# Patient Record
Sex: MALE
[De-identification: names, ages, dates, MRNs, and addresses within clinical notes are randomized per-mention and may not be internally consistent; named-entity substitution may affect disease eponyms.]

---

## 2019-01-28 PROBLEM — Z00.00 ENCOUNTER FOR PREVENTIVE HEALTH EXAMINATION: Status: ACTIVE | Noted: 2019-01-28

## 2019-01-29 ENCOUNTER — APPOINTMENT (OUTPATIENT)
Dept: THORACIC SURGERY | Facility: CLINIC | Age: 54
End: 2019-01-29
Payer: MEDICAID

## 2019-01-29 VITALS
RESPIRATION RATE: 17 BRPM | SYSTOLIC BLOOD PRESSURE: 171 MMHG | HEIGHT: 69 IN | HEART RATE: 75 BPM | DIASTOLIC BLOOD PRESSURE: 97 MMHG | BODY MASS INDEX: 26.66 KG/M2 | WEIGHT: 180 LBS | TEMPERATURE: 97.6 F | OXYGEN SATURATION: 99 %

## 2019-01-29 DIAGNOSIS — Z78.9 OTHER SPECIFIED HEALTH STATUS: ICD-10-CM

## 2019-01-29 DIAGNOSIS — Z82.49 FAMILY HISTORY OF ISCHEMIC HEART DISEASE AND OTHER DISEASES OF THE CIRCULATORY SYSTEM: ICD-10-CM

## 2019-01-29 DIAGNOSIS — Z86.39 PERSONAL HISTORY OF OTHER ENDOCRINE, NUTRITIONAL AND METABOLIC DISEASE: ICD-10-CM

## 2019-01-29 DIAGNOSIS — Z86.79 PERSONAL HISTORY OF OTHER DISEASES OF THE CIRCULATORY SYSTEM: ICD-10-CM

## 2019-01-29 DIAGNOSIS — R91.1 SOLITARY PULMONARY NODULE: ICD-10-CM

## 2019-01-29 DIAGNOSIS — Z86.19 PERSONAL HISTORY OF OTHER INFECTIOUS AND PARASITIC DISEASES: ICD-10-CM

## 2019-01-29 DIAGNOSIS — Z82.3 FAMILY HISTORY OF STROKE: ICD-10-CM

## 2019-01-29 DIAGNOSIS — F17.200 NICOTINE DEPENDENCE, UNSPECIFIED, UNCOMPLICATED: ICD-10-CM

## 2019-01-29 PROCEDURE — 99205 OFFICE O/P NEW HI 60 MIN: CPT

## 2019-01-30 PROBLEM — Z86.39 HISTORY OF TYPE 2 DIABETES MELLITUS: Status: RESOLVED | Noted: 2019-01-30 | Resolved: 2019-01-30

## 2019-01-30 PROBLEM — Z86.19 HISTORY OF HEPATITIS C: Status: RESOLVED | Noted: 2019-01-30 | Resolved: 2019-01-30

## 2019-01-30 PROBLEM — Z82.49 FAMILY HISTORY OF CARDIAC DISORDER: Status: ACTIVE | Noted: 2019-01-30

## 2019-01-30 PROBLEM — Z82.3 FAMILY HISTORY OF CEREBROVASCULAR ACCIDENT (CVA): Status: ACTIVE | Noted: 2019-01-30

## 2019-01-30 PROBLEM — Z86.39 HISTORY OF HYPERLIPIDEMIA: Status: RESOLVED | Noted: 2019-01-30 | Resolved: 2019-01-30

## 2019-01-30 PROBLEM — Z78.9 SOCIAL ALCOHOL USE: Status: ACTIVE | Noted: 2019-01-30

## 2019-01-30 PROBLEM — F17.200 CURRENT SOME DAY SMOKER: Status: ACTIVE | Noted: 2019-01-30

## 2019-01-30 PROBLEM — Z86.79 HISTORY OF HYPERTENSION: Status: RESOLVED | Noted: 2019-01-30 | Resolved: 2019-01-30

## 2019-01-30 PROBLEM — Z82.49 FAMILY HISTORY OF MYOCARDIAL INFARCTION: Status: ACTIVE | Noted: 2019-01-30

## 2019-02-01 NOTE — HISTORY OF PRESENT ILLNESS
[FreeTextEntry1] : 54 y/o M, current smoker, w/ hx of HTN, HLD, DM2, acute Hep C (treated in ~2004), and lung nodule found in Nov 2018 s/p FNA.\par Of note, patient takes medications for HTN, HLD and DM2, but he did not bring any of his medications and he does not recall names or dosages. He states that he obtains his medications from [place].\par \par CT Chest on 11/3/18:\par - a 1.1 x 0.9 x 0.9cm RUL ggo (series 3 image 62)\par - few tiny subpleural nodular foci in the WAI measuring up to 2mm\par \par FNA of RUL on 11/7/18 at [facility] Path showed rare atypical cells; benign lung parenchyma w/ mild fibrosis.\par \par PFTs on 1/28/19: FVC 90%, FEV1 94%, DLCO 75%.\par \par Patient is here today for CT Sx consultation, referred by Dr. Nathen Reeder. Denies SOB, CP, cough.

## 2019-02-01 NOTE — PHYSICAL EXAM
[General Appearance - Alert] : alert [General Appearance - In No Acute Distress] : in no acute distress [Sclera] : the sclera and conjunctiva were normal [PERRL With Normal Accommodation] : pupils were equal in size, round, and reactive to light [Extraocular Movements] : extraocular movements were intact [Outer Ear] : the ears and nose were normal in appearance [Oropharynx] : the oropharynx was normal [Neck Appearance] : the appearance of the neck was normal [Neck Cervical Mass (___cm)] : no neck mass was observed [Jugular Venous Distention Increased] : there was no jugular-venous distention [Thyroid Diffuse Enlargement] : the thyroid was not enlarged [Thyroid Nodule] : there were no palpable thyroid nodules [Auscultation Breath Sounds / Voice Sounds] : lungs were clear to auscultation bilaterally [Heart Rate And Rhythm] : heart rate was normal and rhythm regular [Heart Sounds] : normal S1 and S2 [Heart Sounds Gallop] : no gallops [Murmurs] : no murmurs [Heart Sounds Pericardial Friction Rub] : no pericardial rub [Examination Of The Chest] : the chest was normal in appearance [Chest Visual Inspection Thoracic Asymmetry] : no chest asymmetry [Diminished Respiratory Excursion] : normal chest expansion [2+] : left 2+ [Breast Appearance] : normal in appearance [Breast Palpation Mass] : no palpable masses [Bowel Sounds] : normal bowel sounds [Abdomen Soft] : soft [Abdomen Tenderness] : non-tender [Abdomen Mass (___ Cm)] : no abdominal mass palpated [Cervical Lymph Nodes Enlarged Posterior Bilaterally] : posterior cervical [Cervical Lymph Nodes Enlarged Anterior Bilaterally] : anterior cervical [Supraclavicular Lymph Nodes Enlarged Bilaterally] : supraclavicular [No CVA Tenderness] : no ~M costovertebral angle tenderness [No Spinal Tenderness] : no spinal tenderness [Abnormal Walk] : normal gait [Nail Clubbing] : no clubbing  or cyanosis of the fingernails [Musculoskeletal - Swelling] : no joint swelling seen [Motor Tone] : muscle strength and tone were normal [Skin Color & Pigmentation] : normal skin color and pigmentation [Skin Turgor] : normal skin turgor [] : no rash [Deep Tendon Reflexes (DTR)] : deep tendon reflexes were 2+ and symmetric [Sensation] : the sensory exam was normal to light touch and pinprick [No Focal Deficits] : no focal deficits [Oriented To Time, Place, And Person] : oriented to person, place, and time [Impaired Insight] : insight and judgment were intact [Affect] : the affect was normal [FreeTextEntry1] : deferred

## 2019-02-01 NOTE — DATA REVIEWED
[FreeTextEntry1] : CT Chest on 11/3/18:\par - a 1.1 x 0.9 x 0.9cm RUL ggo (series 3 image 62)\par - few tiny subpleural nodular foci in the WAI measuring up to 2mm\par \par FNA of RUL on 11/7/18 at [facility]/. Path showed rare atypical cells; benign lung parenchyma w/ mild fibrosis.\par \par PFTs on 1/28/19: FVC 90%, FEV1 94%, DLCO 75%.

## 2019-02-01 NOTE — ASSESSMENT
[FreeTextEntry1] : 54 y/o M, current smoker, w/ hx of HTN, HLD, DM2, acute Hep C (treated in ~2004), and lung nodule found in Nov 2018 s/p FNA.\par Of note, patient takes medications for HTN, HLD and DM2, but he did not bring any of his medications and he does not recall names or dosages. He states that he obtains his medications from [place].\par \par CT Chest on 11/3/18:\par - a 1.1 x 0.9 x 0.9cm RUL ggo (series 3 image 62)\par - few tiny subpleural nodular foci in the WAI measuring up to 2mm\par \par I have reviewed the patient's medical records and diagnostic images at time of this office consultation and have made the following recommendation:\par 1. CT scan reviewed with patient, I recommended a Rt VATS RUL wedge rxn, possible RULobectomy, MLND on 2/27/19. Risks and benefits and alternatives explained to patient, all questions answered, patient agreed to proceed with surgery.\par 2. Medical clearance and PST\par 3. Obtain slides from [facility]\par \par Written by Florin Suero NP, acting as a scribe for Dr. John Walker.\par \par The documentation recorded by the scribe accurately reflects the service I personally performed and the decisions made by me. JOHN WALKER MD\par

## 2019-02-01 NOTE — CONSULT LETTER
[Dear  ___] : Dear  [unfilled], [Consult Letter:] : I had the pleasure of evaluating your patient, [unfilled]. [( Thank you for referring [unfilled] for consultation for _____ )] : Thank you for referring [unfilled] for consultation for [unfilled] [Please see my note below.] : Please see my note below. [Consult Closing:] : Thank you very much for allowing me to participate in the care of this patient.  If you have any questions, please do not hesitate to contact me. [Sincerely,] : Sincerely, [DrMorris  ___] : Dr. AREVALO [FreeTextEntry2] : Nathen Reeder MD (Pul/Referring)\yonathan Shin MD (PCP) [FreeTextEntry3] : John Sims MD, MPH \par System Director of Thoracic Surgery \par Director of Comprehensive Lung [facility] \par Professor Cardiovascular & Thoracic Surgery  \par [employer] [facility] [employer] at [facility]\par

## 2019-02-15 ENCOUNTER — RESULT REVIEW (OUTPATIENT)
Age: 54
End: 2019-02-15

## 2019-02-19 ENCOUNTER — OUTPATIENT (OUTPATIENT)
Dept: OUTPATIENT SERVICES | Facility: HOSPITAL | Age: 54
LOS: 1 days | End: 2019-02-19
Payer: MEDICAID

## 2019-02-19 VITALS
HEART RATE: 72 BPM | RESPIRATION RATE: 16 BRPM | DIASTOLIC BLOOD PRESSURE: 80 MMHG | HEIGHT: 68.5 IN | WEIGHT: 186.95 LBS | OXYGEN SATURATION: 98 % | TEMPERATURE: 98 F | SYSTOLIC BLOOD PRESSURE: 130 MMHG

## 2019-02-19 DIAGNOSIS — R91.1 SOLITARY PULMONARY NODULE: ICD-10-CM

## 2019-02-19 DIAGNOSIS — E11.9 TYPE 2 DIABETES MELLITUS WITHOUT COMPLICATIONS: ICD-10-CM

## 2019-02-19 LAB
ANION GAP SERPL CALC-SCNC: 13 MMO/L — SIGNIFICANT CHANGE UP (ref 7–14)
BLD GP AB SCN SERPL QL: NEGATIVE — SIGNIFICANT CHANGE UP
BUN SERPL-MCNC: 17 MG/DL — SIGNIFICANT CHANGE UP (ref 7–23)
CALCIUM SERPL-MCNC: 9.8 MG/DL — SIGNIFICANT CHANGE UP (ref 8.4–10.5)
CHLORIDE SERPL-SCNC: 97 MMOL/L — LOW (ref 98–107)
CO2 SERPL-SCNC: 27 MMOL/L — SIGNIFICANT CHANGE UP (ref 22–31)
CREAT SERPL-MCNC: 0.82 MG/DL — SIGNIFICANT CHANGE UP (ref 0.5–1.3)
GLUCOSE SERPL-MCNC: 223 MG/DL — HIGH (ref 70–99)
HBA1C BLD-MCNC: 5.9 % — HIGH (ref 4–5.6)
HCT VFR BLD CALC: 41.3 % — SIGNIFICANT CHANGE UP (ref 39–50)
HGB BLD-MCNC: 14.3 G/DL — SIGNIFICANT CHANGE UP (ref 13–17)
MCHC RBC-ENTMCNC: 30.6 PG — SIGNIFICANT CHANGE UP (ref 27–34)
MCHC RBC-ENTMCNC: 34.6 % — SIGNIFICANT CHANGE UP (ref 32–36)
MCV RBC AUTO: 88.2 FL — SIGNIFICANT CHANGE UP (ref 80–100)
NRBC # FLD: 0 K/UL — LOW (ref 25–125)
PLATELET # BLD AUTO: 193 K/UL — SIGNIFICANT CHANGE UP (ref 150–400)
PMV BLD: 10.7 FL — SIGNIFICANT CHANGE UP (ref 7–13)
POTASSIUM SERPL-MCNC: 3.8 MMOL/L — SIGNIFICANT CHANGE UP (ref 3.5–5.3)
POTASSIUM SERPL-SCNC: 3.8 MMOL/L — SIGNIFICANT CHANGE UP (ref 3.5–5.3)
RBC # BLD: 4.68 M/UL — SIGNIFICANT CHANGE UP (ref 4.2–5.8)
RBC # FLD: 13.2 % — SIGNIFICANT CHANGE UP (ref 10.3–14.5)
RH IG SCN BLD-IMP: POSITIVE — SIGNIFICANT CHANGE UP
SODIUM SERPL-SCNC: 137 MMOL/L — SIGNIFICANT CHANGE UP (ref 135–145)
WBC # BLD: 7.42 K/UL — SIGNIFICANT CHANGE UP (ref 3.8–10.5)
WBC # FLD AUTO: 7.42 K/UL — SIGNIFICANT CHANGE UP (ref 3.8–10.5)

## 2019-02-19 PROCEDURE — 93010 ELECTROCARDIOGRAM REPORT: CPT

## 2019-02-19 RX ORDER — SODIUM CHLORIDE 9 MG/ML
1000 INJECTION, SOLUTION INTRAVENOUS
Qty: 0 | Refills: 0 | Status: DISCONTINUED | OUTPATIENT
Start: 2019-02-27 | End: 2019-03-03

## 2019-02-19 NOTE — H&P PST ADULT - NEGATIVE MUSCULOSKELETAL SYMPTOMS
no muscle cramps/no arthralgia/no joint swelling/no muscle weakness/no myalgia/no neck pain/no arthritis/no back pain/no stiffness

## 2019-02-19 NOTE — H&P PST ADULT - HISTORY OF PRESENT ILLNESS
53 y.o. male with hx of DM, reports hx of abnormal CXR ~6 months ago, followed by CT scan, diagnosed with solitary pulmonary nodule, pt denies cough, sputum production, hemoptysis, weight loss or fatigue, presents to [facility] for evaluation for Right Video Assisted Thoracoscopy, Robotic Assisted Right Upper Lobe Wedge Resection, Right Upper Lobectomy, Mediastinal Lymph Node Dissection on 02/27/19

## 2019-02-19 NOTE — H&P PST ADULT - NEGATIVE GENERAL GENITOURINARY SYMPTOMS
no incontinence/no renal colic/no hematuria/no flank pain L/no bladder infections/no dysuria/no flank pain R

## 2019-02-19 NOTE — H&P PST ADULT - PROBLEM SELECTOR PLAN 1
Pt scheduled for Right Video Assisted Thoracoscopy, Robotic Assisted Right Upper Lobe Wedge Resection, Right Upper Lobectomy, Mediastinal Lymph Node Dissection on 02/27/19  Preop instructions provided. Pt verbalized understanding.   Pepcid for GI prophylaxis provided   Chlorhexidine wash with instructions provided.   pt has appt with PCP for med eval as per surgeon request, copy requested Pt scheduled for Right Video Assisted Thoracoscopy, Robotic Assisted Right Upper Lobe Wedge Resection, Right Upper Lobectomy, Mediastinal Lymph Node Dissection on 02/27/19  Preop instructions provided. Pt verbalized understanding.   Pepcid for GI prophylaxis provided   Chlorhexidine wash with instructions provided.   med eval with cardiology eval pending, copy requested, abnormal EKG @PST, no comparison EKG available Pt scheduled for Right Video Assisted Thoracoscopy, Robotic Assisted Right Upper Lobe Wedge Resection, Right Upper Lobectomy, Mediastinal Lymph Node Dissection on 02/27/19  Preop instructions provided. Pt verbalized understanding.   Pepcid for GI prophylaxis provided   Chlorhexidine wash with instructions provided.   abnormal EKG @ PST, no comparison EKG available, med eval with cardiology eval pending, copy requested, pt denies SOB, CP, palpitations, VSS Pt scheduled for Right Video Assisted Thoracoscopy, Robotic Assisted Right Upper Lobe Wedge Resection, Right Upper Lobectomy, Mediastinal Lymph Node Dissection on 02/27/19  Preop instructions provided. Pt verbalized understanding.   Pepcid for GI prophylaxis provided   Chlorhexidine wash with instructions provided.   abnormal EKG @ PST, comparison EKG in chart, med eval with cardiology eval pending, copy requested, pt denies SOB, CP, palpitations, VSS

## 2019-02-19 NOTE — H&P PST ADULT - MUSCULOSKELETAL
detailed exam no joint warmth/no calf tenderness/no joint swelling/no joint erythema/normal strength/ROM intact details…

## 2019-02-19 NOTE — H&P PST ADULT - NEGATIVE ENMT SYMPTOMS
no sinus symptoms/no post-nasal discharge/no ear pain/no nasal congestion/no throat pain/no dysphagia/no hearing difficulty

## 2019-02-19 NOTE — H&P PST ADULT - NSANTHOSAYNRD_GEN_A_CORE
No. ADILIA screening performed.  STOP BANG Legend: 0-2 = LOW Risk; 3-4 = INTERMEDIATE Risk; 5-8 = HIGH Risk

## 2019-02-27 ENCOUNTER — TRANSCRIPTION ENCOUNTER (OUTPATIENT)
Age: 54
End: 2019-02-27

## 2019-02-27 ENCOUNTER — APPOINTMENT (OUTPATIENT)
Dept: THORACIC SURGERY | Facility: HOSPITAL | Age: 54
End: 2019-02-27

## 2019-02-27 ENCOUNTER — INPATIENT (INPATIENT)
Facility: HOSPITAL | Age: 54
LOS: 3 days | Discharge: HOME | End: 2019-03-03
Attending: THORACIC SURGERY (CARDIOTHORACIC VASCULAR SURGERY) | Admitting: THORACIC SURGERY (CARDIOTHORACIC VASCULAR SURGERY)
Payer: MEDICAID

## 2019-02-27 VITALS
SYSTOLIC BLOOD PRESSURE: 134 MMHG | HEART RATE: 71 BPM | WEIGHT: 186.95 LBS | TEMPERATURE: 98 F | OXYGEN SATURATION: 98 % | DIASTOLIC BLOOD PRESSURE: 80 MMHG | HEIGHT: 68 IN | RESPIRATION RATE: 16 BRPM

## 2019-02-27 DIAGNOSIS — R91.1 SOLITARY PULMONARY NODULE: ICD-10-CM

## 2019-02-27 LAB — RH IG SCN BLD-IMP: POSITIVE — SIGNIFICANT CHANGE UP

## 2019-02-27 PROCEDURE — 32674 THORACOSCOPY LYMPH NODE EXC: CPT

## 2019-02-27 PROCEDURE — S2900 ROBOTIC SURGICAL SYSTEM: CPT | Mod: NC

## 2019-02-27 PROCEDURE — 99233 SBSQ HOSP IP/OBS HIGH 50: CPT

## 2019-02-27 PROCEDURE — 32666 THORACOSCOPY W/WEDGE RESECT: CPT

## 2019-02-27 PROCEDURE — 32666 THORACOSCOPY W/WEDGE RESECT: CPT | Mod: AS

## 2019-02-27 PROCEDURE — 32674 THORACOSCOPY LYMPH NODE EXC: CPT | Mod: AS

## 2019-02-27 PROCEDURE — 71045 X-RAY EXAM CHEST 1 VIEW: CPT | Mod: 26

## 2019-02-27 RX ORDER — DOCUSATE SODIUM 100 MG
100 CAPSULE ORAL THREE TIMES A DAY
Qty: 0 | Refills: 0 | Status: DISCONTINUED | OUTPATIENT
Start: 2019-02-27 | End: 2019-02-28

## 2019-02-27 RX ORDER — DEXTROSE 50 % IN WATER 50 %
25 SYRINGE (ML) INTRAVENOUS ONCE
Qty: 0 | Refills: 0 | Status: DISCONTINUED | OUTPATIENT
Start: 2019-02-27 | End: 2019-03-03

## 2019-02-27 RX ORDER — HYDROMORPHONE HYDROCHLORIDE 2 MG/ML
30 INJECTION INTRAMUSCULAR; INTRAVENOUS; SUBCUTANEOUS
Qty: 0 | Refills: 0 | Status: DISCONTINUED | OUTPATIENT
Start: 2019-02-27 | End: 2019-02-28

## 2019-02-27 RX ORDER — HEPARIN SODIUM 5000 [USP'U]/ML
5000 INJECTION INTRAVENOUS; SUBCUTANEOUS ONCE
Qty: 0 | Refills: 0 | Status: COMPLETED | OUTPATIENT
Start: 2019-02-27 | End: 2019-02-27

## 2019-02-27 RX ORDER — HYDROMORPHONE HYDROCHLORIDE 2 MG/ML
0.5 INJECTION INTRAMUSCULAR; INTRAVENOUS; SUBCUTANEOUS
Qty: 0 | Refills: 0 | Status: DISCONTINUED | OUTPATIENT
Start: 2019-02-27 | End: 2019-02-28

## 2019-02-27 RX ORDER — SODIUM CHLORIDE 9 MG/ML
1000 INJECTION, SOLUTION INTRAVENOUS
Qty: 0 | Refills: 0 | Status: DISCONTINUED | OUTPATIENT
Start: 2019-02-27 | End: 2019-03-03

## 2019-02-27 RX ORDER — INSULIN LISPRO 100/ML
VIAL (ML) SUBCUTANEOUS AT BEDTIME
Qty: 0 | Refills: 0 | Status: DISCONTINUED | OUTPATIENT
Start: 2019-02-27 | End: 2019-03-03

## 2019-02-27 RX ORDER — DEXTROSE 50 % IN WATER 50 %
12.5 SYRINGE (ML) INTRAVENOUS ONCE
Qty: 0 | Refills: 0 | Status: DISCONTINUED | OUTPATIENT
Start: 2019-02-27 | End: 2019-03-03

## 2019-02-27 RX ORDER — ONDANSETRON 8 MG/1
4 TABLET, FILM COATED ORAL EVERY 6 HOURS
Qty: 0 | Refills: 0 | Status: DISCONTINUED | OUTPATIENT
Start: 2019-02-27 | End: 2019-02-28

## 2019-02-27 RX ORDER — HYDROMORPHONE HYDROCHLORIDE 2 MG/ML
0.5 INJECTION INTRAMUSCULAR; INTRAVENOUS; SUBCUTANEOUS
Qty: 0 | Refills: 0 | Status: DISCONTINUED | OUTPATIENT
Start: 2019-02-27 | End: 2019-02-27

## 2019-02-27 RX ORDER — DEXTROSE 50 % IN WATER 50 %
15 SYRINGE (ML) INTRAVENOUS ONCE
Qty: 0 | Refills: 0 | Status: DISCONTINUED | OUTPATIENT
Start: 2019-02-27 | End: 2019-03-03

## 2019-02-27 RX ORDER — GLUCAGON INJECTION, SOLUTION 0.5 MG/.1ML
1 INJECTION, SOLUTION SUBCUTANEOUS ONCE
Qty: 0 | Refills: 0 | Status: DISCONTINUED | OUTPATIENT
Start: 2019-02-27 | End: 2019-03-03

## 2019-02-27 RX ORDER — ONDANSETRON 8 MG/1
4 TABLET, FILM COATED ORAL ONCE
Qty: 0 | Refills: 0 | Status: DISCONTINUED | OUTPATIENT
Start: 2019-02-27 | End: 2019-02-27

## 2019-02-27 RX ORDER — NALOXONE HYDROCHLORIDE 4 MG/.1ML
0.1 SPRAY NASAL
Qty: 0 | Refills: 0 | Status: DISCONTINUED | OUTPATIENT
Start: 2019-02-27 | End: 2019-02-28

## 2019-02-27 RX ORDER — SENNA PLUS 8.6 MG/1
2 TABLET ORAL AT BEDTIME
Qty: 0 | Refills: 0 | Status: DISCONTINUED | OUTPATIENT
Start: 2019-02-27 | End: 2019-02-28

## 2019-02-27 RX ORDER — METFORMIN HYDROCHLORIDE 850 MG/1
1 TABLET ORAL
Qty: 0 | Refills: 0 | COMMUNITY

## 2019-02-27 RX ORDER — INSULIN LISPRO 100/ML
VIAL (ML) SUBCUTANEOUS
Qty: 0 | Refills: 0 | Status: DISCONTINUED | OUTPATIENT
Start: 2019-02-27 | End: 2019-03-03

## 2019-02-27 RX ADMIN — HEPARIN SODIUM 5000 UNIT(S): 5000 INJECTION INTRAVENOUS; SUBCUTANEOUS at 12:37

## 2019-02-27 RX ADMIN — Medication 100 MILLIGRAM(S): at 22:02

## 2019-02-27 RX ADMIN — HYDROMORPHONE HYDROCHLORIDE 30 MILLILITER(S): 2 INJECTION INTRAMUSCULAR; INTRAVENOUS; SUBCUTANEOUS at 17:05

## 2019-02-27 RX ADMIN — SENNA PLUS 2 TABLET(S): 8.6 TABLET ORAL at 22:02

## 2019-02-27 RX ADMIN — SODIUM CHLORIDE 30 MILLILITER(S): 9 INJECTION, SOLUTION INTRAVENOUS at 12:37

## 2019-02-27 RX ADMIN — Medication 1: at 17:32

## 2019-02-27 NOTE — PROGRESS NOTE ADULT - SUBJECTIVE AND OBJECTIVE BOX
GREENMERRILL                     [id number]    HPI:  53 y.o. male with hx of DM, reports hx of abnormal CXR ~6 months ago, followed by CT scan, diagnosed with solitary pulmonary nodule, pt denies cough, sputum production, hemoptysis, weight loss or fatigue, presents to [facility] for evaluation for Right Video Assisted Thoracoscopy, Robotic Assisted Right Upper Lobe Wedge Resection, Right Upper Lobectomy, Mediastinal Lymph Node Dissection on 02/27/19 (19 Feb 2019 10:06)      Procedure:       Issues:  Lung nodule   Diabetes mellitus  Post op[ pain      PAST MEDICAL & SURGICAL HISTORY:  Solitary pulmonary nodule  Diabetes mellitus  No significant past surgical history            VITAL SIGNS:  Vital Signs Last 24 Hrs  T(C): 36.4 (27 Feb 2019 15:05), Max: 36.6 (27 Feb 2019 09:50)  T(F): 97.5 (27 Feb 2019 15:05), Max: 97.9 (27 Feb 2019 09:50)  HR: 86 (27 Feb 2019 18:30) (69 - 88)  BP: 118/76 (27 Feb 2019 18:30) (96/68 - 134/80)  BP(mean): 86 (27 Feb 2019 18:30) (68 - 97)  RR: 18 (27 Feb 2019 18:30) (12 - 23)  SpO2: 100% (27 Feb 2019 18:30) (94% - 100%)    I/Os:   I&O's Detail    27 Feb 2019 07:01  -  27 Feb 2019 19:42  --------------------------------------------------------  IN:    lactated ringers.: 90 mL    Oral Fluid: 500 mL  Total IN: 590 mL    OUT:    Chest Tube: 100 mL  Total OUT: 100 mL    Total NET: 490 mL          CAPILLARY BLOOD GLUCOSE      POCT Blood Glucose.: 158 mg/dL (27 Feb 2019 16:47)  POCT Blood Glucose.: 110 mg/dL (27 Feb 2019 10:13)      =======================MEDICATIONS===================  MEDICATIONS  (STANDING):  dextrose 5%. 1000 milliLiter(s) (50 mL/Hr) IV Continuous <Continuous>  dextrose 50% Injectable 12.5 Gram(s) IV Push once  dextrose 50% Injectable 25 Gram(s) IV Push once  dextrose 50% Injectable 25 Gram(s) IV Push once  docusate sodium 100 milliGRAM(s) Oral three times a day  HYDROmorphone PCA (1 mG/mL) 30 milliLiter(s) PCA Continuous PCA Continuous  insulin lispro (HumaLOG) corrective regimen sliding scale   SubCutaneous three times a day before meals  insulin lispro (HumaLOG) corrective regimen sliding scale   SubCutaneous at bedtime  lactated ringers. 1000 milliLiter(s) (30 mL/Hr) IV Continuous <Continuous>  senna 2 Tablet(s) Oral at bedtime    MEDICATIONS  (PRN):  dextrose 40% Gel 15 Gram(s) Oral once PRN Blood Glucose LESS THAN 70 milliGRAM(s)/deciliter  glucagon  Injectable 1 milliGRAM(s) IntraMuscular once PRN Glucose LESS THAN 70 milligrams/deciliter  HYDROmorphone PCA (1 mG/mL) Rescue Clinician Bolus 0.5 milliGRAM(s) IV Push every 15 minutes PRN for Pain Scale GREATER THAN 6  naloxone Injectable 0.1 milliGRAM(s) IV Push every 3 minutes PRN For ANY of the following changes in patient status:  A. RR LESS THAN 10 breaths per minute, B. Oxygen saturation LESS THAN 90%, C. Sedation score of 6  ondansetron Injectable 4 milliGRAM(s) IV Push every 6 hours PRN Nausea      PHYSICAL EXAM============================  General:                         Awake, alert, not in any distress  Neuro:                            Moving all extremities to commands.   Respiratory:	Air entry fair and  bilateral conducted sounds                                           Effort even and unlabored.  CV:		Regular rate and rhythm. Normal S1/S2                                          Distal pulses present.  Abdomen:	                     Soft, non-distended. Bowel sounds present   Skin:		No rash.  Extremities:	Warm, no cyanosis or edema.  Palpable pulses      =============================NEUROLOGY============================  Pain control with PCA / Tylenol IV / Toradol     ==============================RESPIRATORY========================  Pt is on  2  L nasal canula   Comfortable, not in any distress.  Using incentive spirometry   Monitor chest tube output  Chest tube to suction 	  Continue bronchodilators, pulmonary toilet    ============================CARDIOVASCULAR======================  Continue hemodynamic monitoring.  Not on any pressors    =====================RENAL===================  Continue LR 30CC/hr    Monitor I/Os and electrolytes    ====================GASTROINTESTINAL===================  On clears, tolerating  Continue GI prophylaxis with  Protonix  Continue Zofran / Reglan for nausea - PRN	    ========================HEMATOLOGIC/ONCOLOGIC====================  Monitor chest tube output. No signs of active bleeding.   Follow CBC in AM    ============================INFECTIOUS DISEASE========================  Monitor for fever / leukocytosis.  All surgical incision / chest tube  sites look clean      Pt is on GI & DVT prophylaxis  OOB & ambulate       Pertinent clinical, laboratory, radiographic, hemodynamic, echocardiographic, respiratory data, microbiologic data and chart were reviewed and analyzed frequently throughout the course of the day and night  Patient seen, examined and plan discussed with CT Surgery / CTICU team during rounds.    Pt's status discussed with family at bedside, updated status        Shadi Montero DO FACEP

## 2019-02-27 NOTE — BRIEF OPERATIVE NOTE - PROCEDURE
<<-----Click on this checkbox to enter Procedure Wedge resection of lung  02/27/2019    Active  [id number]

## 2019-02-28 ENCOUNTER — RESULT REVIEW (OUTPATIENT)
Age: 54
End: 2019-02-28

## 2019-02-28 LAB
ANION GAP SERPL CALC-SCNC: 11 MMO/L — SIGNIFICANT CHANGE UP (ref 7–14)
ANION GAP SERPL CALC-SCNC: 16 MMO/L — HIGH (ref 7–14)
BASOPHILS # BLD AUTO: 0.02 K/UL — SIGNIFICANT CHANGE UP (ref 0–0.2)
BASOPHILS NFR BLD AUTO: 0.2 % — SIGNIFICANT CHANGE UP (ref 0–2)
BUN SERPL-MCNC: 20 MG/DL — SIGNIFICANT CHANGE UP (ref 7–23)
BUN SERPL-MCNC: 21 MG/DL — SIGNIFICANT CHANGE UP (ref 7–23)
CALCIUM SERPL-MCNC: 9.4 MG/DL — SIGNIFICANT CHANGE UP (ref 8.4–10.5)
CALCIUM SERPL-MCNC: 9.4 MG/DL — SIGNIFICANT CHANGE UP (ref 8.4–10.5)
CHLORIDE SERPL-SCNC: 100 MMOL/L — SIGNIFICANT CHANGE UP (ref 98–107)
CHLORIDE SERPL-SCNC: 100 MMOL/L — SIGNIFICANT CHANGE UP (ref 98–107)
CO2 SERPL-SCNC: 19 MMOL/L — LOW (ref 22–31)
CO2 SERPL-SCNC: 26 MMOL/L — SIGNIFICANT CHANGE UP (ref 22–31)
CREAT SERPL-MCNC: 0.91 MG/DL — SIGNIFICANT CHANGE UP (ref 0.5–1.3)
CREAT SERPL-MCNC: 0.93 MG/DL — SIGNIFICANT CHANGE UP (ref 0.5–1.3)
EOSINOPHIL # BLD AUTO: 0 K/UL — SIGNIFICANT CHANGE UP (ref 0–0.5)
EOSINOPHIL NFR BLD AUTO: 0 % — SIGNIFICANT CHANGE UP (ref 0–6)
GLUCOSE SERPL-MCNC: 124 MG/DL — HIGH (ref 70–99)
GLUCOSE SERPL-MCNC: 174 MG/DL — HIGH (ref 70–99)
HCT VFR BLD CALC: 38 % — LOW (ref 39–50)
HCT VFR BLD CALC: 38.6 % — LOW (ref 39–50)
HGB BLD-MCNC: 13.5 G/DL — SIGNIFICANT CHANGE UP (ref 13–17)
HGB BLD-MCNC: 13.6 G/DL — SIGNIFICANT CHANGE UP (ref 13–17)
IMM GRANULOCYTES NFR BLD AUTO: 0.2 % — SIGNIFICANT CHANGE UP (ref 0–1.5)
LYMPHOCYTES # BLD AUTO: 16.6 % — SIGNIFICANT CHANGE UP (ref 13–44)
LYMPHOCYTES # BLD AUTO: 2.02 K/UL — SIGNIFICANT CHANGE UP (ref 1–3.3)
MAGNESIUM SERPL-MCNC: 2.3 MG/DL — SIGNIFICANT CHANGE UP (ref 1.6–2.6)
MCHC RBC-ENTMCNC: 30.5 PG — SIGNIFICANT CHANGE UP (ref 27–34)
MCHC RBC-ENTMCNC: 30.7 PG — SIGNIFICANT CHANGE UP (ref 27–34)
MCHC RBC-ENTMCNC: 35.2 % — SIGNIFICANT CHANGE UP (ref 32–36)
MCHC RBC-ENTMCNC: 35.5 % — SIGNIFICANT CHANGE UP (ref 32–36)
MCV RBC AUTO: 86 FL — SIGNIFICANT CHANGE UP (ref 80–100)
MCV RBC AUTO: 87.1 FL — SIGNIFICANT CHANGE UP (ref 80–100)
MONOCYTES # BLD AUTO: 0.68 K/UL — SIGNIFICANT CHANGE UP (ref 0–0.9)
MONOCYTES NFR BLD AUTO: 5.6 % — SIGNIFICANT CHANGE UP (ref 2–14)
NEUTROPHILS # BLD AUTO: 9.45 K/UL — HIGH (ref 1.8–7.4)
NEUTROPHILS NFR BLD AUTO: 77.4 % — HIGH (ref 43–77)
NRBC # FLD: 0 K/UL — LOW (ref 25–125)
NRBC # FLD: 0 K/UL — LOW (ref 25–125)
PHOSPHATE SERPL-MCNC: 4.4 MG/DL — SIGNIFICANT CHANGE UP (ref 2.5–4.5)
PLATELET # BLD AUTO: 213 K/UL — SIGNIFICANT CHANGE UP (ref 150–400)
PLATELET # BLD AUTO: 214 K/UL — SIGNIFICANT CHANGE UP (ref 150–400)
PMV BLD: 10.3 FL — SIGNIFICANT CHANGE UP (ref 7–13)
PMV BLD: 10.3 FL — SIGNIFICANT CHANGE UP (ref 7–13)
POTASSIUM SERPL-MCNC: 3.7 MMOL/L — SIGNIFICANT CHANGE UP (ref 3.5–5.3)
POTASSIUM SERPL-MCNC: 4 MMOL/L — SIGNIFICANT CHANGE UP (ref 3.5–5.3)
POTASSIUM SERPL-SCNC: 3.7 MMOL/L — SIGNIFICANT CHANGE UP (ref 3.5–5.3)
POTASSIUM SERPL-SCNC: 4 MMOL/L — SIGNIFICANT CHANGE UP (ref 3.5–5.3)
RBC # BLD: 4.42 M/UL — SIGNIFICANT CHANGE UP (ref 4.2–5.8)
RBC # BLD: 4.43 M/UL — SIGNIFICANT CHANGE UP (ref 4.2–5.8)
RBC # FLD: 13 % — SIGNIFICANT CHANGE UP (ref 10.3–14.5)
RBC # FLD: 13.1 % — SIGNIFICANT CHANGE UP (ref 10.3–14.5)
SODIUM SERPL-SCNC: 135 MMOL/L — SIGNIFICANT CHANGE UP (ref 135–145)
SODIUM SERPL-SCNC: 137 MMOL/L — SIGNIFICANT CHANGE UP (ref 135–145)
WBC # BLD: 12.2 K/UL — HIGH (ref 3.8–10.5)
WBC # BLD: 13.08 K/UL — HIGH (ref 3.8–10.5)
WBC # FLD AUTO: 12.2 K/UL — HIGH (ref 3.8–10.5)
WBC # FLD AUTO: 13.08 K/UL — HIGH (ref 3.8–10.5)

## 2019-02-28 PROCEDURE — 71045 X-RAY EXAM CHEST 1 VIEW: CPT | Mod: 26,76

## 2019-02-28 PROCEDURE — 88313 SPECIAL STAINS GROUP 2: CPT | Mod: 26

## 2019-02-28 PROCEDURE — 88307 TISSUE EXAM BY PATHOLOGIST: CPT | Mod: 26

## 2019-02-28 PROCEDURE — S2900 ROBOTIC SURGICAL SYSTEM: CPT | Mod: NC

## 2019-02-28 PROCEDURE — 88305 TISSUE EXAM BY PATHOLOGIST: CPT | Mod: 26

## 2019-02-28 PROCEDURE — 38381 THORACIC DUCT PROCEDURE: CPT | Mod: 78

## 2019-02-28 PROCEDURE — 99233 SBSQ HOSP IP/OBS HIGH 50: CPT

## 2019-02-28 PROCEDURE — 88331 PATH CONSLTJ SURG 1 BLK 1SPC: CPT | Mod: 26

## 2019-02-28 PROCEDURE — 38381 THORACIC DUCT PROCEDURE: CPT | Mod: 80,78

## 2019-02-28 PROCEDURE — 32601 THORACOSCOPY DIAGNOSTIC: CPT | Mod: 78

## 2019-02-28 RX ORDER — OXYCODONE HYDROCHLORIDE 5 MG/1
10 TABLET ORAL
Qty: 0 | Refills: 0 | Status: DISCONTINUED | OUTPATIENT
Start: 2019-02-28 | End: 2019-03-01

## 2019-02-28 RX ORDER — NALOXONE HYDROCHLORIDE 4 MG/.1ML
0.1 SPRAY NASAL
Qty: 0 | Refills: 0 | Status: DISCONTINUED | OUTPATIENT
Start: 2019-02-28 | End: 2019-03-02

## 2019-02-28 RX ORDER — DEXTROSE MONOHYDRATE, SODIUM CHLORIDE, AND POTASSIUM CHLORIDE 50; .745; 4.5 G/1000ML; G/1000ML; G/1000ML
1000 INJECTION, SOLUTION INTRAVENOUS
Qty: 0 | Refills: 0 | Status: DISCONTINUED | OUTPATIENT
Start: 2019-02-28 | End: 2019-02-28

## 2019-02-28 RX ORDER — HYDROMORPHONE HYDROCHLORIDE 2 MG/ML
30 INJECTION INTRAMUSCULAR; INTRAVENOUS; SUBCUTANEOUS
Qty: 0 | Refills: 0 | Status: DISCONTINUED | OUTPATIENT
Start: 2019-02-28 | End: 2019-03-02

## 2019-02-28 RX ORDER — HEPARIN SODIUM 5000 [USP'U]/ML
5000 INJECTION INTRAVENOUS; SUBCUTANEOUS EVERY 8 HOURS
Qty: 0 | Refills: 0 | Status: DISCONTINUED | OUTPATIENT
Start: 2019-02-28 | End: 2019-03-03

## 2019-02-28 RX ORDER — ONDANSETRON 8 MG/1
4 TABLET, FILM COATED ORAL EVERY 6 HOURS
Qty: 0 | Refills: 0 | Status: DISCONTINUED | OUTPATIENT
Start: 2019-02-28 | End: 2019-03-02

## 2019-02-28 RX ORDER — ACETAMINOPHEN 500 MG
650 TABLET ORAL EVERY 6 HOURS
Qty: 0 | Refills: 0 | Status: DISCONTINUED | OUTPATIENT
Start: 2019-02-28 | End: 2019-03-01

## 2019-02-28 RX ORDER — OXYCODONE HYDROCHLORIDE 5 MG/1
5 TABLET ORAL
Qty: 0 | Refills: 0 | Status: DISCONTINUED | OUTPATIENT
Start: 2019-02-28 | End: 2019-03-01

## 2019-02-28 RX ADMIN — Medication 100 MILLIGRAM(S): at 06:37

## 2019-02-28 RX ADMIN — SODIUM CHLORIDE 30 MILLILITER(S): 9 INJECTION, SOLUTION INTRAVENOUS at 07:18

## 2019-02-28 RX ADMIN — SODIUM CHLORIDE 30 MILLILITER(S): 9 INJECTION, SOLUTION INTRAVENOUS at 20:38

## 2019-02-28 RX ADMIN — HYDROMORPHONE HYDROCHLORIDE 30 MILLILITER(S): 2 INJECTION INTRAMUSCULAR; INTRAVENOUS; SUBCUTANEOUS at 23:05

## 2019-02-28 RX ADMIN — Medication 1: at 11:18

## 2019-02-28 RX ADMIN — HYDROMORPHONE HYDROCHLORIDE 30 MILLILITER(S): 2 INJECTION INTRAMUSCULAR; INTRAVENOUS; SUBCUTANEOUS at 07:20

## 2019-02-28 RX ADMIN — HEPARIN SODIUM 5000 UNIT(S): 5000 INJECTION INTRAVENOUS; SUBCUTANEOUS at 21:09

## 2019-02-28 RX ADMIN — HEPARIN SODIUM 5000 UNIT(S): 5000 INJECTION INTRAVENOUS; SUBCUTANEOUS at 15:52

## 2019-02-28 NOTE — PROGRESS NOTE ADULT - SUBJECTIVE AND OBJECTIVE BOX
Anesthesia Pain Management Service    SUBJECTIVE: Patient is doing well with IV PCA and no significant problems reported. Tolerating PO    Pain Scale Score	At rest: __2_ 	With Activity: ___ 	[X ] Refer to charted pain scores    THERAPY:    [ ] IV PCA Morphine		[ ] 5 mg/mL	[ ] 1 mg/mL  [X ] IV PCA Hydromorphone	[ ] 5 mg/mL	[X ] 1 mg/mL  [ ] IV PCA Fentanyl		[ ] 50 micrograms/mL    Demand dose __0.2_ lockout __6_ (minutes) Continuous Rate _0__ Total: _0.8__   mg used (in past 24 hrs)      MEDICATIONS  (STANDING):  acetaminophen   Tablet .. 650 milliGRAM(s) Oral every 6 hours  dextrose 5% + sodium chloride 0.45% with potassium chloride 20 mEq/L 1000 milliLiter(s) (75 mL/Hr) IV Continuous <Continuous>  dextrose 5%. 1000 milliLiter(s) (50 mL/Hr) IV Continuous <Continuous>  dextrose 50% Injectable 12.5 Gram(s) IV Push once  dextrose 50% Injectable 25 Gram(s) IV Push once  dextrose 50% Injectable 25 Gram(s) IV Push once  heparin  Injectable 5000 Unit(s) SubCutaneous every 8 hours  insulin lispro (HumaLOG) corrective regimen sliding scale   SubCutaneous three times a day before meals  insulin lispro (HumaLOG) corrective regimen sliding scale   SubCutaneous at bedtime  lactated ringers. 1000 milliLiter(s) (30 mL/Hr) IV Continuous <Continuous>    MEDICATIONS  (PRN):  dextrose 40% Gel 15 Gram(s) Oral once PRN Blood Glucose LESS THAN 70 milliGRAM(s)/deciliter  glucagon  Injectable 1 milliGRAM(s) IntraMuscular once PRN Glucose LESS THAN 70 milligrams/deciliter  oxyCODONE    IR 5 milliGRAM(s) Oral every 3 hours PRN mild to moderate  oxyCODONE    IR 10 milliGRAM(s) Oral every 3 hours PRN Severe Pain (7 - 10)      OBJECTIVE:    Sedation Score:	[ X] Alert	[ ] Drowsy 	[ ] Arousable	[ ] Asleep	[ ] Unresponsive    Side Effects:	[X ] None	[ ] Nausea	[ ] Vomiting	[ ] Pruritus  		[ ] Other:    Vital Signs Last 24 Hrs  T(C): 36.4 (28 Feb 2019 08:00), Max: 36.7 (28 Feb 2019 00:00)  T(F): 97.6 (28 Feb 2019 08:00), Max: 98 (28 Feb 2019 00:00)  HR: 67 (28 Feb 2019 11:00) (67 - 89)  BP: 104/72 (28 Feb 2019 11:00) (96/68 - 137/85)  BP(mean): 80 (28 Feb 2019 11:00) (68 - 104)  RR: 13 (28 Feb 2019 11:00) (10 - 23)  SpO2: 99% (28 Feb 2019 11:00) (94% - 100%)    ASSESSMENT/ PLAN    Therapy to  be:	[ ] Continue   [ X] Discontinued   [X ] Change to prn Analgesics    Documentation and Verification of current medications:   [X] Done	[ ] Not done, not elligible    Comments: PRN Oral/IV opioids and/or Adjuvant non-opioid medication to be ordered at this point.    Progress Note written now but Patient was seen earlier.

## 2019-02-28 NOTE — PROGRESS NOTE ADULT - SUBJECTIVE AND OBJECTIVE BOX
PETERMERRILL                     [id number]    HPI:  53 y.o. male with hx of DM, reports hx of abnormal CXR ~6 months ago, followed by CT scan, diagnosed with solitary pulmonary nodule, pt denies cough, sputum production, hemoptysis, weight loss or fatigue, presents to [facility] for evaluation for Right Video Assisted Thoracoscopy, Robotic Assisted Right Upper Lobe Wedge Resection, Right Upper Lobectomy, Mediastinal Lymph Node Dissection on 02/27/19 (19 Feb 2019 10:06)      Procedure:   Wedge resection of lung , RUL 02/27/2019     Issues:  Lung nodule   Diabetes mellitus  Post op[ pain      PAST MEDICAL & SURGICAL HISTORY:  Solitary pulmonary nodule  Diabetes mellitus  No significant past surgical history            VITAL SIGNS:  Vital Signs Last 24 Hrs  T(C): 36.7 (28 Feb 2019 00:00), Max: 36.7 (28 Feb 2019 00:00)  T(F): 98 (28 Feb 2019 00:00), Max: 98 (28 Feb 2019 00:00)  HR: 80 (28 Feb 2019 06:00) (69 - 89)  BP: 132/93 (28 Feb 2019 06:00) (96/68 - 137/85)  BP(mean): 102 (28 Feb 2019 06:00) (68 - 104)  RR: 22 (28 Feb 2019 06:00) (10 - 23)  SpO2: 100% (28 Feb 2019 06:00) (94% - 100%)    I/Os:   I&O's Detail    27 Feb 2019 07:01  -  28 Feb 2019 07:00  --------------------------------------------------------  IN:    lactated ringers.: 480 mL    Oral Fluid: 740 mL  Total IN: 1220 mL    OUT:    Chest Tube: 320 mL    Voided: 700 mL  Total OUT: 1020 mL    Total NET: 200 mL          CAPILLARY BLOOD GLUCOSE      POCT Blood Glucose.: 214 mg/dL (27 Feb 2019 22:04)  POCT Blood Glucose.: 158 mg/dL (27 Feb 2019 16:47)  POCT Blood Glucose.: 110 mg/dL (27 Feb 2019 10:13)      =======================MEDICATIONS===================  MEDICATIONS  (STANDING):  dextrose 5%. 1000 milliLiter(s) (50 mL/Hr) IV Continuous <Continuous>  dextrose 50% Injectable 12.5 Gram(s) IV Push once  dextrose 50% Injectable 25 Gram(s) IV Push once  dextrose 50% Injectable 25 Gram(s) IV Push once  docusate sodium 100 milliGRAM(s) Oral three times a day  heparin  Injectable 5000 Unit(s) SubCutaneous every 8 hours  HYDROmorphone PCA (1 mG/mL) 30 milliLiter(s) PCA Continuous PCA Continuous  insulin lispro (HumaLOG) corrective regimen sliding scale   SubCutaneous three times a day before meals  insulin lispro (HumaLOG) corrective regimen sliding scale   SubCutaneous at bedtime  lactated ringers. 1000 milliLiter(s) (30 mL/Hr) IV Continuous <Continuous>  senna 2 Tablet(s) Oral at bedtime    MEDICATIONS  (PRN):  dextrose 40% Gel 15 Gram(s) Oral once PRN Blood Glucose LESS THAN 70 milliGRAM(s)/deciliter  glucagon  Injectable 1 milliGRAM(s) IntraMuscular once PRN Glucose LESS THAN 70 milligrams/deciliter  HYDROmorphone PCA (1 mG/mL) Rescue Clinician Bolus 0.5 milliGRAM(s) IV Push every 15 minutes PRN for Pain Scale GREATER THAN 6  naloxone Injectable 0.1 milliGRAM(s) IV Push every 3 minutes PRN For ANY of the following changes in patient status:  A. RR LESS THAN 10 breaths per minute, B. Oxygen saturation LESS THAN 90%, C. Sedation score of 6  ondansetron Injectable 4 milliGRAM(s) IV Push every 6 hours PRN Nausea      =======================VENTILATOR SETTINGS===================      =======================PATIENT CARE ACCESS DEVICES===================  Peripheral IV  Arterial Line	R	L	PT	DP	Fem	Rad	Ax	Placed:		  Urinary Catheter, Date Placed:   Necessity of urinary, arterial, and venous catheters discussed    PHYSICAL EXAM============================  General:                         Awake, alert, not in any distress  Neuro:                            Moving all extremities to commands.   Respiratory:	Air entry fair and  bilateral conducted sounds                                           Effort even and unlabored.  CV:		Regular rate and rhythm. Normal S1/S2                                          Distal pulses present.  Abdomen:	                     Soft, non-distended. Bowel sounds present   Skin:		No rash.  Extremities:	Warm, no cyanosis or edema.  Palpable pulses    ============================LABS=========================                        13.5   12.20 )-----------( 214      ( 28 Feb 2019 04:00 )             38.0     02-28    135  |  100  |  20  ----------------------------<  174<H>  4.0   |  19<L>  |  0.91    Ca    9.4      28 Feb 2019 04:00    A/P     ===========================NEUROLOGY============================  Pain control with PCA / Tylenol IV / Toradol     ==============================RESPIRATORY========================  Pt is on  2  L nasal canula   Comfortable, not in any distress.  Using incentive spirometry   Monitor chest tube output  Chest tube to suction 	  Continue bronchodilators, pulmonary toilet    ============================CARDIOVASCULAR======================  Continue hemodynamic monitoring.  Not on any pressors    =====================RENAL===================  Continue LR 30CC/hr    Monitor I/Os and electrolytes    ====================GASTROINTESTINAL===================  On Reg,  tolerating  Continue GI prophylaxis with  Protonix  Continue Zofran / Reglan for nausea - PRN	    ========================HEMATOLOGIC/ONCOLOGIC====================  Monitor chest tube output. No signs of active bleeding.   Follow CBC in AM    ============================INFECTIOUS DISEASE========================  Monitor for fever / leukocytosis.  All surgical incision / chest tube  sites look clean      Pt is on GI & DVT prophylaxis  OOB & ambulate       Pertinent clinical, laboratory, radiographic, hemodynamic, echocardiographic, respiratory data, microbiologic data and chart were reviewed and analyzed frequently throughout the course of the day and night  Patient seen, examined and plan discussed with CT Surgery / CTICU team during rounds.    Pt's status discussed with family at bedside, updated status            Shadi Montero DO FACEP

## 2019-02-28 NOTE — BRIEF OPERATIVE NOTE - OPERATION/FINDINGS
FB. RUL wedge, MLND
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

## 2019-02-28 NOTE — PROGRESS NOTE ADULT - SUBJECTIVE AND OBJECTIVE BOX
ANESTHESIA POSTOP CHECK    53y Male POSTOP DAY 1 S/P R VATS    Vital Signs Last 24 Hrs  T(C): 36.4 (28 Feb 2019 08:00), Max: 36.7 (28 Feb 2019 00:00)  T(F): 97.6 (28 Feb 2019 08:00), Max: 98 (28 Feb 2019 00:00)  HR: 67 (28 Feb 2019 11:00) (67 - 89)  BP: 104/72 (28 Feb 2019 11:00) (96/68 - 137/85)  BP(mean): 80 (28 Feb 2019 11:00) (68 - 104)  RR: 13 (28 Feb 2019 11:00) (10 - 23)  SpO2: 99% (28 Feb 2019 11:00) (94% - 100%)  I&O's Summary    27 Feb 2019 07:01  -  28 Feb 2019 07:00  --------------------------------------------------------  IN: 1460 mL / OUT: 1350 mL / NET: 110 mL    28 Feb 2019 07:01  -  28 Feb 2019 12:25  --------------------------------------------------------  IN: 405 mL / OUT: 100 mL / NET: 305 mL        [X ] NO APPARENT ANESTHESIA COMPLICATIONS      Comments: none

## 2019-02-28 NOTE — BRIEF OPERATIVE NOTE - PROCEDURE
<<-----Click on this checkbox to enter Procedure Ligation of thoracic duct  02/28/2019  Robotic right VATS, ligation of thoracic duct, control of chyle leak  Active  LATONIA

## 2019-03-01 LAB
ANION GAP SERPL CALC-SCNC: 14 MMO/L — SIGNIFICANT CHANGE UP (ref 7–14)
BUN SERPL-MCNC: 15 MG/DL — SIGNIFICANT CHANGE UP (ref 7–23)
CALCIUM SERPL-MCNC: 8.9 MG/DL — SIGNIFICANT CHANGE UP (ref 8.4–10.5)
CHLORIDE SERPL-SCNC: 101 MMOL/L — SIGNIFICANT CHANGE UP (ref 98–107)
CO2 SERPL-SCNC: 22 MMOL/L — SIGNIFICANT CHANGE UP (ref 22–31)
CREAT SERPL-MCNC: 0.92 MG/DL — SIGNIFICANT CHANGE UP (ref 0.5–1.3)
GLUCOSE SERPL-MCNC: 141 MG/DL — HIGH (ref 70–99)
HCT VFR BLD CALC: 40.8 % — SIGNIFICANT CHANGE UP (ref 39–50)
HGB BLD-MCNC: 14 G/DL — SIGNIFICANT CHANGE UP (ref 13–17)
MCHC RBC-ENTMCNC: 30.3 PG — SIGNIFICANT CHANGE UP (ref 27–34)
MCHC RBC-ENTMCNC: 34.3 % — SIGNIFICANT CHANGE UP (ref 32–36)
MCV RBC AUTO: 88.3 FL — SIGNIFICANT CHANGE UP (ref 80–100)
NRBC # FLD: 0 K/UL — LOW (ref 25–125)
PLATELET # BLD AUTO: 223 K/UL — SIGNIFICANT CHANGE UP (ref 150–400)
PMV BLD: 10.5 FL — SIGNIFICANT CHANGE UP (ref 7–13)
POTASSIUM SERPL-MCNC: 4.1 MMOL/L — SIGNIFICANT CHANGE UP (ref 3.5–5.3)
POTASSIUM SERPL-SCNC: 4.1 MMOL/L — SIGNIFICANT CHANGE UP (ref 3.5–5.3)
RBC # BLD: 4.62 M/UL — SIGNIFICANT CHANGE UP (ref 4.2–5.8)
RBC # FLD: 13.3 % — SIGNIFICANT CHANGE UP (ref 10.3–14.5)
SODIUM SERPL-SCNC: 137 MMOL/L — SIGNIFICANT CHANGE UP (ref 135–145)
WBC # BLD: 15.53 K/UL — HIGH (ref 3.8–10.5)
WBC # FLD AUTO: 15.53 K/UL — HIGH (ref 3.8–10.5)

## 2019-03-01 PROCEDURE — 71045 X-RAY EXAM CHEST 1 VIEW: CPT | Mod: 26

## 2019-03-01 PROCEDURE — 99233 SBSQ HOSP IP/OBS HIGH 50: CPT

## 2019-03-01 RX ORDER — IPRATROPIUM BROMIDE 0.2 MG/ML
500 SOLUTION, NON-ORAL INHALATION EVERY 6 HOURS
Qty: 0 | Refills: 0 | Status: DISCONTINUED | OUTPATIENT
Start: 2019-03-01 | End: 2019-03-03

## 2019-03-01 RX ORDER — SODIUM CHLORIDE 9 MG/ML
5 INJECTION INTRAMUSCULAR; INTRAVENOUS; SUBCUTANEOUS ONCE
Qty: 0 | Refills: 0 | Status: COMPLETED | OUTPATIENT
Start: 2019-03-01 | End: 2019-03-02

## 2019-03-01 RX ORDER — ALBUTEROL 90 UG/1
2 AEROSOL, METERED ORAL EVERY 4 HOURS
Qty: 0 | Refills: 0 | Status: DISCONTINUED | OUTPATIENT
Start: 2019-03-01 | End: 2019-03-03

## 2019-03-01 RX ORDER — HYDROMORPHONE HYDROCHLORIDE 2 MG/ML
0.5 INJECTION INTRAMUSCULAR; INTRAVENOUS; SUBCUTANEOUS
Qty: 0 | Refills: 0 | Status: DISCONTINUED | OUTPATIENT
Start: 2019-03-01 | End: 2019-03-02

## 2019-03-01 RX ORDER — ACETAMINOPHEN 500 MG
1000 TABLET ORAL ONCE
Qty: 0 | Refills: 0 | Status: DISCONTINUED | OUTPATIENT
Start: 2019-03-01 | End: 2019-03-03

## 2019-03-01 RX ORDER — ACETAMINOPHEN 500 MG
1000 TABLET ORAL ONCE
Qty: 0 | Refills: 0 | Status: COMPLETED | OUTPATIENT
Start: 2019-03-01 | End: 2019-03-01

## 2019-03-01 RX ADMIN — Medication 1000 MILLIGRAM(S): at 20:30

## 2019-03-01 RX ADMIN — SODIUM CHLORIDE 30 MILLILITER(S): 9 INJECTION, SOLUTION INTRAVENOUS at 19:25

## 2019-03-01 RX ADMIN — Medication 400 MILLIGRAM(S): at 20:00

## 2019-03-01 RX ADMIN — HYDROMORPHONE HYDROCHLORIDE 30 MILLILITER(S): 2 INJECTION INTRAMUSCULAR; INTRAVENOUS; SUBCUTANEOUS at 19:26

## 2019-03-01 RX ADMIN — HYDROMORPHONE HYDROCHLORIDE 30 MILLILITER(S): 2 INJECTION INTRAMUSCULAR; INTRAVENOUS; SUBCUTANEOUS at 07:20

## 2019-03-01 RX ADMIN — HEPARIN SODIUM 5000 UNIT(S): 5000 INJECTION INTRAVENOUS; SUBCUTANEOUS at 14:06

## 2019-03-01 RX ADMIN — HEPARIN SODIUM 5000 UNIT(S): 5000 INJECTION INTRAVENOUS; SUBCUTANEOUS at 21:47

## 2019-03-01 RX ADMIN — Medication 1: at 11:48

## 2019-03-01 RX ADMIN — HEPARIN SODIUM 5000 UNIT(S): 5000 INJECTION INTRAVENOUS; SUBCUTANEOUS at 06:16

## 2019-03-01 NOTE — PROGRESS NOTE ADULT - SUBJECTIVE AND OBJECTIVE BOX
ANESTHESIA POSTOP CHECK    53y Male POSTOP DAY 1 S/P revision of R VAT    Vital Signs Last 24 Hrs  T(C): 36.6 (01 Mar 2019 08:00), Max: 36.8 (01 Mar 2019 04:00)  T(F): 97.9 (01 Mar 2019 08:00), Max: 98.2 (01 Mar 2019 04:00)  HR: 72 (01 Mar 2019 08:00) (63 - 81)  BP: 108/67 (01 Mar 2019 08:00) (95/66 - 147/90)  BP(mean): 78 (01 Mar 2019 08:00) (66 - 99)  RR: 14 (01 Mar 2019 08:00) (13 - 25)  SpO2: 99% (01 Mar 2019 08:00) (94% - 100%)  I&O's Summary    28 Feb 2019 07:01  -  01 Mar 2019 07:00  --------------------------------------------------------  IN: 1185 mL / OUT: 935 mL / NET: 250 mL    01 Mar 2019 07:01  -  01 Mar 2019 09:14  --------------------------------------------------------  IN: 30 mL / OUT: 0 mL / NET: 30 mL        [X ] NO APPARENT ANESTHESIA COMPLICATIONS      Comments: none

## 2019-03-01 NOTE — PROGRESS NOTE ADULT - SUBJECTIVE AND OBJECTIVE BOX
Anesthesia Pain Management Service    SUBJECTIVE: Patient is doing well with IV PCA, no nausea reported.  Patient still has pain despite the IV PCA pump.    Pain Scale Score	At rest: _10/10_ 	With Activity: ___ 	[X ] Refer to charted pain scores    THERAPY:    [ ] IV PCA Morphine		[ ] 5 mg/mL	[ ] 1 mg/mL  [X ] IV PCA Hydromorphone	[ ] 5 mg/mL	[X ] 1 mg/mL  [ ] IV PCA Fentanyl		[ ] 50 micrograms/mL    Demand dose __0.2_ lockout __6_ (minutes) Continuous Rate _0__ Total: _1.8__  mg used (in past 24 hours)      MEDICATIONS  (STANDING):  acetaminophen  IVPB .. 1000 milliGRAM(s) IV Intermittent once  dextrose 5%. 1000 milliLiter(s) (50 mL/Hr) IV Continuous <Continuous>  dextrose 50% Injectable 12.5 Gram(s) IV Push once  dextrose 50% Injectable 25 Gram(s) IV Push once  dextrose 50% Injectable 25 Gram(s) IV Push once  heparin  Injectable 5000 Unit(s) SubCutaneous every 8 hours  HYDROmorphone PCA (1 mG/mL) 30 milliLiter(s) PCA Continuous PCA Continuous  insulin lispro (HumaLOG) corrective regimen sliding scale   SubCutaneous three times a day before meals  insulin lispro (HumaLOG) corrective regimen sliding scale   SubCutaneous at bedtime  lactated ringers. 1000 milliLiter(s) (30 mL/Hr) IV Continuous <Continuous>    MEDICATIONS  (PRN):  dextrose 40% Gel 15 Gram(s) Oral once PRN Blood Glucose LESS THAN 70 milliGRAM(s)/deciliter  glucagon  Injectable 1 milliGRAM(s) IntraMuscular once PRN Glucose LESS THAN 70 milligrams/deciliter  HYDROmorphone PCA (1 mG/mL) Rescue Clinician Bolus 0.5 milliGRAM(s) IV Push every 15 minutes PRN for Pain Scale GREATER THAN 6  naloxone Injectable 0.1 milliGRAM(s) IV Push every 3 minutes PRN For ANY of the following changes in patient status:  A. RR LESS THAN 10 breaths per minute, B. Oxygen saturation LESS THAN 90%, C. Sedation score of 6  ondansetron Injectable 4 milliGRAM(s) IV Push every 6 hours PRN Nausea      OBJECTIVE:  Patient is sitting up in chair, drinking clears.    Sedation Score:	[ X] Alert	[ ] Drowsy 	[ ] Arousable	[ ] Asleep	[ ] Unresponsive    Side Effects:	[X ] None	[ ] Nausea	[ ] Vomiting	[ ] Pruritus  		[ ] Other:    Vital Signs Last 24 Hrs  T(C): 36.6 (01 Mar 2019 08:00), Max: 36.8 (01 Mar 2019 04:00)  T(F): 97.9 (01 Mar 2019 08:00), Max: 98.2 (01 Mar 2019 04:00)  HR: 72 (01 Mar 2019 08:00) (63 - 81)  BP: 108/67 (01 Mar 2019 08:00) (95/66 - 147/90)  BP(mean): 78 (01 Mar 2019 08:00) (66 - 99)  RR: 14 (01 Mar 2019 08:00) (13 - 25)  SpO2: 99% (01 Mar 2019 08:00) (94% - 100%)    ASSESSMENT/ PLAN    Therapy to  be:	[ X] Continue   [ ] Discontinued   [ ] Change to prn Analgesics    Documentation and Verification of current medications:   [X] Done	[ ] Not done, not elligible    Comments: Continue current pain regimen and IV Tylenol added.

## 2019-03-01 NOTE — PROGRESS NOTE ADULT - SUBJECTIVE AND OBJECTIVE BOX
MERRILL GREEN            [id number]         No Known Allergies               53 y.o. male with hx of DM, reports hx of abnormal CXR ~6 months ago, followed by CT scan, diagnosed with solitary pulmonary nodule, pt denies cough, sputum production, hemoptysis, weight loss or fatigue, presents to [facility] for evaluation for Right Video Assisted Thoracoscopy, Robotic Assisted Right Upper Lobe Wedge Resection, Right Upper Lobectomy, Mediastinal Lymph Node Dissection on 02/27/19 (19 Feb 2019 10:06)      Procedure: 2/27 FB. RUL wedge, MLNDFB.        2/28 Robotic right VATS, ligation of thoracic duct, control of chyle leak    Issues:  Chylothorax  Lung nodule  DM-2  Postop                   Home Medications:  metFORMIN 500 mg oral tablet: 1 tab(s) orally 3 times a day (27 Feb 2019 10:30)      PAST MEDICAL & SURGICAL HISTORY:  Solitary pulmonary nodule  Diabetes mellitus  No significant past surgical history        ICU Vital Signs Last 24 Hrs  T(C): 36.8 (01 Mar 2019 04:00), Max: 36.8 (01 Mar 2019 04:00)  T(F): 98.2 (01 Mar 2019 04:00), Max: 98.2 (01 Mar 2019 04:00)  HR: 67 (01 Mar 2019 04:00) (67 - 85)  BP: 101/64 (01 Mar 2019 04:00) (95/66 - 147/90)  BP(mean): 73 (01 Mar 2019 04:00) (66 - 102)  ABP: --  ABP(mean): --  RR: 17 (01 Mar 2019 04:00) (13 - 25)  SpO2: 96% (01 Mar 2019 04:00) (94% - 100%)    I&O's Detail    27 Feb 2019 07:01  -  28 Feb 2019 07:00  --------------------------------------------------------  IN:    lactated ringers.: 480 mL    Oral Fluid: 980 mL  Total IN: 1460 mL    OUT:    Chest Tube: 350 mL    Voided: 1000 mL  Total OUT: 1350 mL    Total NET: 110 mL      28 Feb 2019 07:01  -  01 Mar 2019 05:01  --------------------------------------------------------  IN:    dextrose 5% + sodium chloride 0.45% with potassium chloride 20 mEq/L: 525 mL    lactated ringers.: 330 mL    Oral Fluid: 240 mL  Total IN: 1095 mL    OUT:    Chest Tube: 335 mL    Voided: 550 mL  Total OUT: 885 mL    Total NET: 210 mL        CAPILLARY BLOOD GLUCOSE      POCT Blood Glucose.: 209 mg/dL (28 Feb 2019 21:04)      Home Medications:  metFORMIN 500 mg oral tablet: 1 tab(s) orally 3 times a day (27 Feb 2019 10:30)      MEDICATIONS  (STANDING):  acetaminophen   Tablet .. 650 milliGRAM(s) Oral every 6 hours  dextrose 5%. 1000 milliLiter(s) (50 mL/Hr) IV Continuous <Continuous>  dextrose 50% Injectable 12.5 Gram(s) IV Push once  dextrose 50% Injectable 25 Gram(s) IV Push once  dextrose 50% Injectable 25 Gram(s) IV Push once  heparin  Injectable 5000 Unit(s) SubCutaneous every 8 hours  HYDROmorphone PCA (1 mG/mL) 30 milliLiter(s) PCA Continuous PCA Continuous  insulin lispro (HumaLOG) corrective regimen sliding scale   SubCutaneous three times a day before meals  insulin lispro (HumaLOG) corrective regimen sliding scale   SubCutaneous at bedtime  lactated ringers. 1000 milliLiter(s) (30 mL/Hr) IV Continuous <Continuous>    MEDICATIONS  (PRN):  dextrose 40% Gel 15 Gram(s) Oral once PRN Blood Glucose LESS THAN 70 milliGRAM(s)/deciliter  glucagon  Injectable 1 milliGRAM(s) IntraMuscular once PRN Glucose LESS THAN 70 milligrams/deciliter  naloxone Injectable 0.1 milliGRAM(s) IV Push every 3 minutes PRN For ANY of the following changes in patient status:  A. RR LESS THAN 10 breaths per minute, B. Oxygen saturation LESS THAN 90%, C. Sedation score of 6  ondansetron Injectable 4 milliGRAM(s) IV Push every 6 hours PRN Nausea  oxyCODONE    IR 5 milliGRAM(s) Oral every 3 hours PRN mild to moderate  oxyCODONE    IR 10 milliGRAM(s) Oral every 3 hours PRN Severe Pain (7 - 10)          Physical exam:   General:               Pt is awake, alert,  not in any distress                                                  Neuro:                  Nonfocal                             Cardiovascular:   S1 & S2, regular                           Respiratory:         Air entry is fair and equal on both sides, has bilateral conducted sounds                           GI:                          Soft, nondistended and nontender, Bowel sounds active                            Ext:                        No cyanosis or edema                               Labs:                                                                           14.0   15.53 )-----------( 223      ( 01 Mar 2019 02:45 )             40.8             03-01    137  |  101  |  15  ----------------------------<  141<H>  4.1   |  22  |  0.92    Ca    8.9      01 Mar 2019 02:45  Phos  4.4     02-28  Mg     2.3     02-28                        CXR:    < from: Xray Chest 1 View- PORTABLE-Routine (02.28.19 @ 07:30) >  February 27 at 3:21 PM:  Right chest tube is seen overlying the apex of this hemithorax.  Patchy   opacity overlying right midlung field probably atelectasis. Remainder of   both lungs are clear. No effusion or pneumothorax.    February 28 at 6:19 AM:  No interval change. Persistent opacity unchanged likely postsurgical in   nature. No complicating effusion or pneumothorax.    COMPARISON:  None available      IMPRESSION:  Status post right thoracotomy with chest tube and right   midlung opacity may represent postsurgical atelectasis.        Plan:    General: 53yMale s/p FB. RUL wedge, MLND. Postop pt was noted to have chylous drainage from the chest tube. Pt went to O.R and had thoracic duct ligated.  Experiencing  pain with deep breathing.                             Neuro:                                         Pain control with PCA / Tylenol IV                            Cardiovascular:                                          Continue hemodynamic monitoring.                            Respiratory:                                         Pt is on 2L  nasal canula, wean off as tolerated                                          Comfortable, not in any distress.                                         Encourage incentive spirometry                                          Monitor chest tube output                                         Chest tube to suction                                                                 Continue bronchodilators, pulmonary toilet                            GI                                         Currently NPO. Start low fat diet                                          Continue Zofran / Reglan for nausea - PRN	                                                              Renal:                                         Continue LR 30cc/hr                                         Monitor I/Os and electrolytes                                                                                        Hem/ Onc:                                                                                  Monitor chest tube output &  signs of bleeding.                                          Follow CBC in AM                           Infectious disease:                                            No signs of infection. Monitor for fever / leukocytosis.                                          All surgical incision / chest tube  sites look clean                            Endocrine                                             Continue Accu-Checks with coverage    Pt is on SQ Heparin and Venodyne boots for DVT prophylaxis.     Pertinent clinical, laboratory, radiographic, hemodynamic, echocardiographic, respiratory data, microbiologic data and chart were reviewed and analyzed frequently throughout the course of the day and night  Patient seen, examined and plan discussed with CT Surgeon Dr. Sims  / CTICU team during rounds.    Pt's status discussed with family at bedside, updated status            Huang Marte MD

## 2019-03-02 LAB
ALBUMIN SERPL ELPH-MCNC: 3.2 G/DL — LOW (ref 3.3–5)
ALP SERPL-CCNC: 45 U/L — SIGNIFICANT CHANGE UP (ref 40–120)
ALT FLD-CCNC: 10 U/L — SIGNIFICANT CHANGE UP (ref 4–41)
ANION GAP SERPL CALC-SCNC: 13 MMO/L — SIGNIFICANT CHANGE UP (ref 7–14)
AST SERPL-CCNC: 14 U/L — SIGNIFICANT CHANGE UP (ref 4–40)
BILIRUB SERPL-MCNC: 0.7 MG/DL — SIGNIFICANT CHANGE UP (ref 0.2–1.2)
BUN SERPL-MCNC: 12 MG/DL — SIGNIFICANT CHANGE UP (ref 7–23)
CALCIUM SERPL-MCNC: 8.5 MG/DL — SIGNIFICANT CHANGE UP (ref 8.4–10.5)
CHLORIDE SERPL-SCNC: 101 MMOL/L — SIGNIFICANT CHANGE UP (ref 98–107)
CO2 SERPL-SCNC: 23 MMOL/L — SIGNIFICANT CHANGE UP (ref 22–31)
CREAT SERPL-MCNC: 0.83 MG/DL — SIGNIFICANT CHANGE UP (ref 0.5–1.3)
GLUCOSE SERPL-MCNC: 132 MG/DL — HIGH (ref 70–99)
HCT VFR BLD CALC: 39.3 % — SIGNIFICANT CHANGE UP (ref 39–50)
HGB BLD-MCNC: 13 G/DL — SIGNIFICANT CHANGE UP (ref 13–17)
MAGNESIUM SERPL-MCNC: 2.1 MG/DL — SIGNIFICANT CHANGE UP (ref 1.6–2.6)
MCHC RBC-ENTMCNC: 30 PG — SIGNIFICANT CHANGE UP (ref 27–34)
MCHC RBC-ENTMCNC: 33.1 % — SIGNIFICANT CHANGE UP (ref 32–36)
MCV RBC AUTO: 90.8 FL — SIGNIFICANT CHANGE UP (ref 80–100)
NRBC # FLD: 0 K/UL — LOW (ref 25–125)
PHOSPHATE SERPL-MCNC: 3.2 MG/DL — SIGNIFICANT CHANGE UP (ref 2.5–4.5)
PLATELET # BLD AUTO: 203 K/UL — SIGNIFICANT CHANGE UP (ref 150–400)
PMV BLD: 10.8 FL — SIGNIFICANT CHANGE UP (ref 7–13)
POTASSIUM SERPL-MCNC: 3.6 MMOL/L — SIGNIFICANT CHANGE UP (ref 3.5–5.3)
POTASSIUM SERPL-SCNC: 3.6 MMOL/L — SIGNIFICANT CHANGE UP (ref 3.5–5.3)
PROT SERPL-MCNC: 6 G/DL — SIGNIFICANT CHANGE UP (ref 6–8.3)
RBC # BLD: 4.33 M/UL — SIGNIFICANT CHANGE UP (ref 4.2–5.8)
RBC # FLD: 13.2 % — SIGNIFICANT CHANGE UP (ref 10.3–14.5)
SODIUM SERPL-SCNC: 137 MMOL/L — SIGNIFICANT CHANGE UP (ref 135–145)
WBC # BLD: 9.36 K/UL — SIGNIFICANT CHANGE UP (ref 3.8–10.5)
WBC # FLD AUTO: 9.36 K/UL — SIGNIFICANT CHANGE UP (ref 3.8–10.5)

## 2019-03-02 PROCEDURE — 71045 X-RAY EXAM CHEST 1 VIEW: CPT | Mod: 26

## 2019-03-02 PROCEDURE — 99233 SBSQ HOSP IP/OBS HIGH 50: CPT

## 2019-03-02 RX ORDER — HYDROMORPHONE HYDROCHLORIDE 2 MG/ML
0.5 INJECTION INTRAMUSCULAR; INTRAVENOUS; SUBCUTANEOUS
Qty: 0 | Refills: 0 | Status: DISCONTINUED | OUTPATIENT
Start: 2019-03-02 | End: 2019-03-03

## 2019-03-02 RX ORDER — OXYCODONE HYDROCHLORIDE 5 MG/1
5 TABLET ORAL
Qty: 0 | Refills: 0 | Status: DISCONTINUED | OUTPATIENT
Start: 2019-03-02 | End: 2019-03-03

## 2019-03-02 RX ORDER — POLYETHYLENE GLYCOL 3350 17 G/17G
17 POWDER, FOR SOLUTION ORAL DAILY
Qty: 0 | Refills: 0 | Status: DISCONTINUED | OUTPATIENT
Start: 2019-03-02 | End: 2019-03-03

## 2019-03-02 RX ORDER — DOCUSATE SODIUM 100 MG
100 CAPSULE ORAL
Qty: 0 | Refills: 0 | Status: DISCONTINUED | OUTPATIENT
Start: 2019-03-02 | End: 2019-03-03

## 2019-03-02 RX ORDER — OXYCODONE HYDROCHLORIDE 5 MG/1
10 TABLET ORAL
Qty: 0 | Refills: 0 | Status: DISCONTINUED | OUTPATIENT
Start: 2019-03-02 | End: 2019-03-03

## 2019-03-02 RX ORDER — POTASSIUM CHLORIDE 20 MEQ
40 PACKET (EA) ORAL ONCE
Qty: 0 | Refills: 0 | Status: COMPLETED | OUTPATIENT
Start: 2019-03-02 | End: 2019-03-02

## 2019-03-02 RX ADMIN — SODIUM CHLORIDE 5 MILLILITER(S): 9 INJECTION INTRAMUSCULAR; INTRAVENOUS; SUBCUTANEOUS at 05:39

## 2019-03-02 RX ADMIN — Medication 500 MICROGRAM(S): at 05:38

## 2019-03-02 RX ADMIN — HYDROMORPHONE HYDROCHLORIDE 30 MILLILITER(S): 2 INJECTION INTRAMUSCULAR; INTRAVENOUS; SUBCUTANEOUS at 07:43

## 2019-03-02 RX ADMIN — HEPARIN SODIUM 5000 UNIT(S): 5000 INJECTION INTRAVENOUS; SUBCUTANEOUS at 14:14

## 2019-03-02 RX ADMIN — HEPARIN SODIUM 5000 UNIT(S): 5000 INJECTION INTRAVENOUS; SUBCUTANEOUS at 06:04

## 2019-03-02 RX ADMIN — OXYCODONE HYDROCHLORIDE 10 MILLIGRAM(S): 5 TABLET ORAL at 21:41

## 2019-03-02 RX ADMIN — Medication 500 MICROGRAM(S): at 10:10

## 2019-03-02 RX ADMIN — HEPARIN SODIUM 5000 UNIT(S): 5000 INJECTION INTRAVENOUS; SUBCUTANEOUS at 21:40

## 2019-03-02 RX ADMIN — OXYCODONE HYDROCHLORIDE 10 MILLIGRAM(S): 5 TABLET ORAL at 22:40

## 2019-03-02 RX ADMIN — SODIUM CHLORIDE 30 MILLILITER(S): 9 INJECTION, SOLUTION INTRAVENOUS at 08:05

## 2019-03-02 RX ADMIN — Medication 500 MICROGRAM(S): at 21:44

## 2019-03-02 RX ADMIN — Medication 40 MILLIEQUIVALENT(S): at 06:04

## 2019-03-02 RX ADMIN — Medication 100 MILLIGRAM(S): at 17:52

## 2019-03-02 NOTE — PROGRESS NOTE ADULT - SUBJECTIVE AND OBJECTIVE BOX
GREENMERRILL          MRN-[id number]    HPI:  53 y.o. male with hx of DM, reports hx of abnormal CXR ~6 months ago, followed by CT scan, diagnosed with solitary pulmonary nodule, pt denies cough, sputum production, hemoptysis, weight loss or fatigue, presents to [facility] for evaluation for Right Video Assisted Thoracoscopy, Robotic Assisted Right Upper Lobe Wedge Resection, Right Upper Lobectomy, Mediastinal Lymph Node Dissection on 02/27/19 (19 Feb 2019 10:06)      Procedure:  POD # :     Issues:        Interval/Overnight OR Events/ ROS  Pt extubated in the OR after surgery. On arrival in ICU still drowsy - but easily arousable to following commands; denies SOB, no nausea, no vomiting  Respiratory status required full ventilatory support,  following of ABG’s with A-line monitoring, continuous pulse oximetry monitoring, & an IV Propofol infusion for support & to evaluate for & prevent further decompensation secondary to persistent cardiopulmonary dysfunction.     Pt remained hemodynamically stable overnight, not on any pressors or inotropes. OOB to chair, breathing comfortably with minimal pain. Ambulated several times . Denies pain, no SOB, no palpitations, no nausea/ no vomiting, no dizziness  A-line and gomez d/brenda       PAST MEDICAL & SURGICAL HISTORY:  Solitary pulmonary nodule  Diabetes mellitus  No significant past surgical history    Allergies    No Known Allergies    Intolerances      Home Medications:  metFORMIN 500 mg oral tablet: 1 tab(s) orally 3 times a day (27 Feb 2019 10:30)        ***VITAL SIGNS:  Vital Signs Last 24 Hrs  T(C): 37.3 (02 Mar 2019 04:00), Max: 37.4 (01 Mar 2019 20:00)  T(F): 99.2 (02 Mar 2019 04:00), Max: 99.4 (01 Mar 2019 20:00)  HR: 70 (02 Mar 2019 04:00) (67 - 76)  BP: 106/73 (02 Mar 2019 04:00) (103/51 - 123/80)  BP(mean): 81 (02 Mar 2019 04:00) (62 - 90)  RR: 17 (02 Mar 2019 04:00) (12 - 28)  SpO2: 96% (02 Mar 2019 04:00) (95% - 99%)    I/Os:   I&O's Detail    28 Feb 2019 07:01  -  01 Mar 2019 07:00  --------------------------------------------------------  IN:    dextrose 5% + sodium chloride 0.45% with potassium chloride 20 mEq/L: 525 mL    lactated ringers.: 420 mL    Oral Fluid: 240 mL  Total IN: 1185 mL    OUT:    Chest Tube: 385 mL    Voided: 550 mL  Total OUT: 935 mL    Total NET: 250 mL      01 Mar 2019 07:01  -  02 Mar 2019 06:49  --------------------------------------------------------  IN:    IV PiggyBack: 100 mL    lactated ringers.: 660 mL  Total IN: 760 mL    OUT:    Chest Tube: 390 mL    Voided: 1100 mL  Total OUT: 1490 mL    Total NET: -730 mL          CAPILLARY BLOOD GLUCOSE      POCT Blood Glucose.: 125 mg/dL (02 Mar 2019 06:02)  POCT Blood Glucose.: 143 mg/dL (01 Mar 2019 22:03)  POCT Blood Glucose.: 138 mg/dL (01 Mar 2019 17:17)  POCT Blood Glucose.: 172 mg/dL (01 Mar 2019 11:46)      =======================  MEDICATIONS  ===================  MEDICATIONS  (STANDING):  acetaminophen  IVPB .. 1000 milliGRAM(s) IV Intermittent once  dextrose 5%. 1000 milliLiter(s) (50 mL/Hr) IV Continuous <Continuous>  dextrose 50% Injectable 12.5 Gram(s) IV Push once  dextrose 50% Injectable 25 Gram(s) IV Push once  dextrose 50% Injectable 25 Gram(s) IV Push once  heparin  Injectable 5000 Unit(s) SubCutaneous every 8 hours  HYDROmorphone PCA (1 mG/mL) 30 milliLiter(s) PCA Continuous PCA Continuous  insulin lispro (HumaLOG) corrective regimen sliding scale   SubCutaneous three times a day before meals  insulin lispro (HumaLOG) corrective regimen sliding scale   SubCutaneous at bedtime  ipratropium    for Nebulization 500 MICROGram(s) Nebulizer every 6 hours  lactated ringers. 1000 milliLiter(s) (30 mL/Hr) IV Continuous <Continuous>    MEDICATIONS  (PRN):  ALBUTerol    90 MICROgram(s) HFA Inhaler 2 Puff(s) Inhalation every 4 hours PRN Shortness of Breath and/or Wheezing  dextrose 40% Gel 15 Gram(s) Oral once PRN Blood Glucose LESS THAN 70 milliGRAM(s)/deciliter  glucagon  Injectable 1 milliGRAM(s) IntraMuscular once PRN Glucose LESS THAN 70 milligrams/deciliter  HYDROmorphone PCA (1 mG/mL) Rescue Clinician Bolus 0.5 milliGRAM(s) IV Push every 15 minutes PRN for Pain Scale GREATER THAN 6  naloxone Injectable 0.1 milliGRAM(s) IV Push every 3 minutes PRN For ANY of the following changes in patient status:  A. RR LESS THAN 10 breaths per minute, B. Oxygen saturation LESS THAN 90%, C. Sedation score of 6  ondansetron Injectable 4 milliGRAM(s) IV Push every 6 hours PRN Nausea      ======================VENTILATOR SETTINGS  ==============      =================== PATIENT CARE ACCESS DEVICES ==========  Peripheral IV  Central Venous Line	R	L	IJ	Fem	SC	Placed:   Arterial Line	R	L	PT	DP	Fem	Rad	Ax	Placed:   Midline:				  Urinary Catheter, Date Placed:   Necessity of urinary, arterial, and venous catheters discussed  ======================= PHYSICAL EXAM===================  General:          Comfortable, Awake, alert, no distress  Neuro:             Moving all extremities to commands. No focal deficits	  HEENT:                           MAGED/ ETT/ NGT/ trach  Respiratory:	Lungs clear on auscultation bilaterally with good aeration.                            No rales, rhonchi, no wheezing. Effort even and unlabored.  CV:		         Regular rate and rhythm. Normal S1/S2. No murmurs  Abdomen:	Soft,  nontender, not-distended. Bowel sounds present / absent.   Skin:		No rash.  Extremities:	Warm, no cyanosis or edema.  Palpable pulses  ============================ LABS =======================                        13.0   9.36  )-----------( 203      ( 02 Mar 2019 02:30 )             39.3     03-02    137  |  101  |  12  ----------------------------<  132<H>  3.6   |  23  |  0.83    Ca    8.5      02 Mar 2019 02:30  Phos  3.2     03-02  Mg     2.1     03-02    TPro  6.0  /  Alb  3.2<L>  /  TBili  0.7  /  DBili  x   /  AST  14  /  ALT  10  /  AlkPhos  45  03-02    LIVER FUNCTIONS - ( 02 Mar 2019 02:30 )  Alb: 3.2 g/dL / Pro: 6.0 g/dL / ALK PHOS: 45 u/L / ALT: 10 u/L / AST: 14 u/L / GGT: x                     ===================== IMAGING STUDIES ===================  Radiology personally reviewed.    ====================ASSESSMENT AND PLAN ================      ====================== NEUROLOGY=======================  Pain control with PCA / PCEA / Tylenol IV / Toradol / Percocet  Pt is on Precedex for agitation  Pt is sedated with Propofol / Fentanyl  ==================== RESPIRATORY========================  Pt is on       L nasal canula / Face tent____% FiO2/ full mech vent support  Comfortable, no evidence of distress.  Using incentive spirometry & doing                ml  Monitor chest tube output  Chest tube to suction / water seal	  Mechanical Ventilation:    Mechanical ventilator status assessed & settings reviewed  Continuous pulse oximetry monitoring  Continue bronchodilators, pulmonary toilet  Head of bed elevation to 30-40 degrees  ====================CARDIOVASCULAR=====================  Continue hemodynamic monitoring/ telemetry  Not on any pressors/ titrate pressors for SBP>100, MAP >60-65  Continue cardiovascular / antihypertensive medications  ===================== RENAL ============================  Continue LR 30CC/hr      D/C IVF  Monitor I/Os, BUN/ Cr  and electrolytes  D/C Gomez      Keep Gomez for UO monitoring  BPH: Continue Flomax/ Finasteride  ==================== GASTROINTESTINAL===================  On regular/ tube feeds diet, tolerating well  Continue GI prophylaxis with Pepcid / Protonix  Continue Zofran / Reglan for nausea - PRN	  NPO  =======================    ENDOCRIN  =====================  Glycemic monitoring  F/S with coverage  ===================HEMATOLOGIC/ONCOLOGIC =============  Monitor chest tube output. No signs of active bleeding.   Follow CBC, coags  in AM  DVT prophylaxis with SCD, sc Heparin  ========================INFECTIOUS DISEASE===============  No signs of infection. Monitor for fever / leukocytosis.  All surgical incision / chest tube  sites look clean  D/C Gomez    Pertinent clinical, laboratory, radiographic, hemodynamic, echocardiographic, respiratory data, microbiologic data and chart were reviewed and analyzed frequently throughout the course of the day and night. GI and DVT prophylaxis, glycemic control, head of bed elevation and skin care issues were addressed.  Patient seen, examined and plan discussed with CT Surgery / CTICU team during rounds.  Pt remains critically ill in imminent risk of  deterioration and requires very careful cardio- pulmonary monitoring and support.    I have spent        minutes of critical care time with this pt between      am/pm   and        am/ pm         minutes spent on total encounter; more than 50% of the visit was spent counseling and/or coordinating care by the attending physician.      TRISH Castañeda MD PETERMERRILL          MRN-[id number]    HPI:  53 y.o. male with hx of DM, reports hx of abnormal CXR ~6 months ago, followed by CT scan, diagnosed with solitary pulmonary nodule, pt denies cough, sputum production, hemoptysis, weight loss or fatigue, presents to [facility] for evaluation for Right Video Assisted Thoracoscopy, Robotic Assisted Right Upper Lobe Wedge Resection, Right Upper Lobectomy, Mediastinal Lymph Node Dissection on 02/27/19 (19 Feb 2019 10:06)      Procedure: 2/27 FB. RUL wedge, MLNDFB.        2/28 Robotic right VATS, ligation of thoracic duct, control of chyle leak    Issues:  Chylothorax  Lung nodule  DM-2  Postop pain  chest tube in place      Interval/Overnight  Events/ ROS   Pt remained hemodynamically stable overnight, not on any pressors or inotropes. OOB to chair, breathing comfortably with minimal pain. Ambulated several times . Denies pain, no SOB, no palpitations, no nausea/ no vomiting, no dizziness       PAST MEDICAL & SURGICAL HISTORY:  Solitary pulmonary nodule  Diabetes mellitus  No significant past surgical history    Allergies  No Known Allergies      Home Medications:  metFORMIN 500 mg oral tablet: 1 tab(s) orally 3 times a day (27 Feb 2019 10:30)      ***VITAL SIGNS:  Vital Signs Last 24 Hrs  T(C): 37.3 (02 Mar 2019 04:00), Max: 37.4 (01 Mar 2019 20:00)  T(F): 99.2 (02 Mar 2019 04:00), Max: 99.4 (01 Mar 2019 20:00)  HR: 70 (02 Mar 2019 04:00) (67 - 76)  BP: 106/73 (02 Mar 2019 04:00) (103/51 - 123/80)  BP(mean): 81 (02 Mar 2019 04:00) (62 - 90)  RR: 17 (02 Mar 2019 04:00) (12 - 28)  SpO2: 96% (02 Mar 2019 04:00) (95% - 99%)    I/Os:   I&O's Detail    28 Feb 2019 07:01  -  01 Mar 2019 07:00  --------------------------------------------------------  IN:    dextrose 5% + sodium chloride 0.45% with potassium chloride 20 mEq/L: 525 mL    lactated ringers.: 420 mL    Oral Fluid: 240 mL  Total IN: 1185 mL    OUT:    Chest Tube: 385 mL    Voided: 550 mL  Total OUT: 935 mL    Total NET: 250 mL      01 Mar 2019 07:01  -  02 Mar 2019 06:49  --------------------------------------------------------  IN:    IV PiggyBack: 100 mL    lactated ringers.: 660 mL  Total IN: 760 mL    OUT:    Chest Tube: 390 mL    Voided: 1100 mL  Total OUT: 1490 mL    Total NET: -730 mL    CAPILLARY BLOOD GLUCOSE  POCT Blood Glucose.: 125 mg/dL (02 Mar 2019 06:02)  POCT Blood Glucose.: 143 mg/dL (01 Mar 2019 22:03)  POCT Blood Glucose.: 138 mg/dL (01 Mar 2019 17:17)  POCT Blood Glucose.: 172 mg/dL (01 Mar 2019 11:46)    =======================  MEDICATIONS  ===================  MEDICATIONS  (STANDING):  acetaminophen  IVPB .. 1000 milliGRAM(s) IV Intermittent once  dextrose 5%. 1000 milliLiter(s) (50 mL/Hr) IV Continuous <Continuous>  dextrose 50% Injectable 12.5 Gram(s) IV Push once  dextrose 50% Injectable 25 Gram(s) IV Push once  dextrose 50% Injectable 25 Gram(s) IV Push once  heparin  Injectable 5000 Unit(s) SubCutaneous every 8 hours  HYDROmorphone PCA (1 mG/mL) 30 milliLiter(s) PCA Continuous PCA Continuous  insulin lispro (HumaLOG) corrective regimen sliding scale   SubCutaneous three times a day before meals  insulin lispro (HumaLOG) corrective regimen sliding scale   SubCutaneous at bedtime  ipratropium    for Nebulization 500 MICROGram(s) Nebulizer every 6 hours  lactated ringers. 1000 milliLiter(s) (30 mL/Hr) IV Continuous <Continuous>    MEDICATIONS  (PRN):  ALBUTerol    90 MICROgram(s) HFA Inhaler 2 Puff(s) Inhalation every 4 hours PRN Shortness of Breath and/or Wheezing  dextrose 40% Gel 15 Gram(s) Oral once PRN Blood Glucose LESS THAN 70 milliGRAM(s)/deciliter  glucagon  Injectable 1 milliGRAM(s) IntraMuscular once PRN Glucose LESS THAN 70 milligrams/deciliter  HYDROmorphone PCA (1 mG/mL) Rescue Clinician Bolus 0.5 milliGRAM(s) IV Push every 15 minutes PRN for Pain Scale GREATER THAN 6  naloxone Injectable 0.1 milliGRAM(s) IV Push every 3 minutes PRN For ANY of the following changes in patient status:  A. RR LESS THAN 10 breaths per minute, B. Oxygen saturation LESS THAN 90%, C. Sedation score of 6  ondansetron Injectable 4 milliGRAM(s) IV Push every 6 hours PRN Nausea    ======================= PHYSICAL EXAM===================  General:          Comfortable, Awake, alert, no distress  Neuro:             Moving all extremities to commands. No focal deficits	  HEENT:                           MAGED   Respiratory:	Lungs clear on auscultation bilaterally with good aeration.                            No rales, rhonchi, no wheezing. Effort even and unlabored.  CV:	            Regular rate and rhythm. Normal S1/S2. No murmurs  Abdomen:	Soft,  nontender, not-distended. Bowel sounds present   Skin:		No rash.  Extremities:	Warm, no cyanosis or edema.  Palpable pulses    ============================ LABS =======================                        13.0   9.36  )-----------( 203      ( 02 Mar 2019 02:30 )             39.3     03-02    137  |  101  |  12  ----------------------------<  132<H>  3.6   |  23  |  0.83    Ca    8.5      02 Mar 2019 02:30  Phos  3.2     03-02  Mg     2.1     03-02    TPro  6.0  /  Alb  3.2<L>  /  TBili  0.7  /  DBili  x   /  AST  14  /  ALT  10  /  AlkPhos  45  03-02    LIVER FUNCTIONS - ( 02 Mar 2019 02:30 )  Alb: 3.2 g/dL / Pro: 6.0 g/dL / ALK PHOS: 45 u/L / ALT: 10 u/L / AST: 14 u/L / GGT: x           ===================== IMAGING STUDIES ===================  Radiology personally reviewed.    ====================ASSESSMENT AND PLAN ================      ====================== NEUROLOGY=======================  Pain control with PCA / PCEA / Tylenol IV / Toradol / Percocet  Pt is on Precedex for agitation  Pt is sedated with Propofol / Fentanyl  ==================== RESPIRATORY========================  Pt is on       L nasal canula / Face tent____% FiO2/ full mech vent support  Comfortable, no evidence of distress.  Using incentive spirometry & doing                ml  Monitor chest tube output  Chest tube to suction / water seal	  Mechanical Ventilation:    Mechanical ventilator status assessed & settings reviewed  Continuous pulse oximetry monitoring  Continue bronchodilators, pulmonary toilet  Head of bed elevation to 30-40 degrees  ====================CARDIOVASCULAR=====================  Continue hemodynamic monitoring/ telemetry  Not on any pressors/ titrate pressors for SBP>100, MAP >60-65  Continue cardiovascular / antihypertensive medications  ===================== RENAL ============================  Continue LR 30CC/hr      D/C IVF  Monitor I/Os, BUN/ Cr  and electrolytes  D/C Wood      Keep Wood for UO monitoring  BPH: Continue Flomax/ Finasteride  ==================== GASTROINTESTINAL===================  On regular/ tube feeds diet, tolerating well  Continue GI prophylaxis with Pepcid / Protonix  Continue Zofran / Reglan for nausea - PRN	  NPO  =======================    ENDOCRIN  =====================  Glycemic monitoring  F/S with coverage  ===================HEMATOLOGIC/ONCOLOGIC =============  Monitor chest tube output. No signs of active bleeding.   Follow CBC, coags  in AM  DVT prophylaxis with SCD, sc Heparin  ========================INFECTIOUS DISEASE===============  No signs of infection. Monitor for fever / leukocytosis.  All surgical incision / chest tube  sites look clean  D/C Wood    Pertinent clinical, laboratory, radiographic, hemodynamic, echocardiographic, respiratory data, microbiologic data and chart were reviewed and analyzed frequently throughout the course of the day and night. GI and DVT prophylaxis, glycemic control, head of bed elevation and skin care issues were addressed.  Patient seen, examined and plan discussed with CT Surgery / CTICU team during rounds.  Pt remains critically ill in imminent risk of  deterioration and requires very careful cardio- pulmonary monitoring and support.    I have spent        minutes of critical care time with this pt between      am/pm   and        am/ pm         minutes spent on total encounter; more than 50% of the visit was spent counseling and/or coordinating care by the attending physician.      TRISH Castañeda MD                                               Labs:                                                                           14.0   15.53 )-----------( 223      ( 01 Mar 2019 02:45 )             40.8             03-01    137  |  101  |  15  ----------------------------<  141<H>  4.1   |  22  |  0.92    Ca    8.9      01 Mar 2019 02:45  Phos  4.4     02-28  Mg     2.3     02-28                        CXR:    < from: Xray Chest 1 View- PORTABLE-Routine (02.28.19 @ 07:30) >  February 27 at 3:21 PM:  Right chest tube is seen overlying the apex of this hemithorax.  Patchy   opacity overlying right midlung field probably atelectasis. Remainder of   both lungs are clear. No effusion or pneumothorax.    February 28 at 6:19 AM:  No interval change. Persistent opacity unchanged likely postsurgical in   nature. No complicating effusion or pneumothorax.    COMPARISON:  None available      IMPRESSION:  Status post right thoracotomy with chest tube and right   midlung opacity may represent postsurgical atelectasis.        Plan:    General: 53yMale s/p FB. RUL wedge, MLND. Postop pt was noted to have chylous drainage from the chest tube. Pt went to O.R and had thoracic duct ligated.  Experiencing  pain with deep breathing.                             Neuro:                                         Pain control with PCA / Tylenol IV                            Cardiovascular:                                          Continue hemodynamic monitoring.                            Respiratory:                                         Pt is on 2L  nasal canula, wean off as tolerated                                          Comfortable, not in any distress.                                         Encourage incentive spirometry                                          Monitor chest tube output                                         Chest tube to suction                                                                 Continue bronchodilators, pulmonary toilet                            GI                                         Currently NPO. Start low fat diet                                          Continue Zofran / Reglan for nausea - PRN	                                                              Renal:                                         Continue LR 30cc/hr                                         Monitor I/Os and electrolytes                                                                                        Hem/ Onc:                                                                                  Monitor chest tube output &  signs of bleeding.                                          Follow CBC in AM                           Infectious disease:                                            No signs of infection. Monitor for fever / leukocytosis.                                          All surgical incision / chest tube  sites look clean                            Endocrine                                             Continue Accu-Checks with coverage    Pt is on SQ Heparin and Venodyne boots for DVT prophylaxis.     Pertinent clinical, laboratory, radiographic, hemodynamic, echocardiographic, respiratory data, microbiologic data and chart were reviewed and analyzed frequently throughout the course of the day and night  Patient seen, examined and plan discussed with CT Surgeon Dr. Sims  / CTICU team during rounds.    Pt's status discussed with family at bedside, updated status PETERMERRILL          MRN-[id number]    HPI:  53 y.o. male with hx of DM, reports hx of abnormal CXR ~6 months ago, followed by CT scan, diagnosed with solitary pulmonary nodule, pt denies cough, sputum production, hemoptysis, weight loss or fatigue, presents to [facility] for evaluation for Right Video Assisted Thoracoscopy, Robotic Assisted Right Upper Lobe Wedge Resection, Right Upper Lobectomy, Mediastinal Lymph Node Dissection on 02/27/19 (19 Feb 2019 10:06)      Procedure: 2/27 FB. RUL wedge, MLNDFB.        2/28 Robotic right VATS, ligation of thoracic duct, control of chyle leak    Issues:  Chylothorax  Lung nodule  DM-2  Postop pain  chest tube in place      Interval/Overnight  Events/ ROS   Pt remained hemodynamically stable overnight, not on any pressors or inotropes. OOB to chair, breathing comfortably with minimal pain. Ambulated several times . Denies pain, no SOB, no palpitations, no nausea/ no vomiting, no dizziness       PAST MEDICAL & SURGICAL HISTORY:  Solitary pulmonary nodule  Diabetes mellitus  No significant past surgical history    Allergies  No Known Allergies      Home Medications:  metFORMIN 500 mg oral tablet: 1 tab(s) orally 3 times a day (27 Feb 2019 10:30)      ***VITAL SIGNS:  Vital Signs Last 24 Hrs  T(C): 37.3 (02 Mar 2019 04:00), Max: 37.4 (01 Mar 2019 20:00)  T(F): 99.2 (02 Mar 2019 04:00), Max: 99.4 (01 Mar 2019 20:00)  HR: 70 (02 Mar 2019 04:00) (67 - 76)  BP: 106/73 (02 Mar 2019 04:00) (103/51 - 123/80)  BP(mean): 81 (02 Mar 2019 04:00) (62 - 90)  RR: 17 (02 Mar 2019 04:00) (12 - 28)  SpO2: 96% (02 Mar 2019 04:00) (95% - 99%)    I/Os:   I&O's Detail    28 Feb 2019 07:01  -  01 Mar 2019 07:00  --------------------------------------------------------  IN:    dextrose 5% + sodium chloride 0.45% with potassium chloride 20 mEq/L: 525 mL    lactated ringers.: 420 mL    Oral Fluid: 240 mL  Total IN: 1185 mL    OUT:    Chest Tube: 385 mL    Voided: 550 mL  Total OUT: 935 mL    Total NET: 250 mL      01 Mar 2019 07:01  -  02 Mar 2019 06:49  --------------------------------------------------------  IN:    IV PiggyBack: 100 mL    lactated ringers.: 660 mL  Total IN: 760 mL    OUT:    Chest Tube: 390 mL    Voided: 1100 mL  Total OUT: 1490 mL    Total NET: -730 mL    CAPILLARY BLOOD GLUCOSE  POCT Blood Glucose.: 125 mg/dL (02 Mar 2019 06:02)  POCT Blood Glucose.: 143 mg/dL (01 Mar 2019 22:03)  POCT Blood Glucose.: 138 mg/dL (01 Mar 2019 17:17)  POCT Blood Glucose.: 172 mg/dL (01 Mar 2019 11:46)    =======================  MEDICATIONS  ===================  MEDICATIONS  (STANDING):  acetaminophen  IVPB .. 1000 milliGRAM(s) IV Intermittent once  dextrose 5%. 1000 milliLiter(s) (50 mL/Hr) IV Continuous <Continuous>  dextrose 50% Injectable 12.5 Gram(s) IV Push once  dextrose 50% Injectable 25 Gram(s) IV Push once  dextrose 50% Injectable 25 Gram(s) IV Push once  heparin  Injectable 5000 Unit(s) SubCutaneous every 8 hours  HYDROmorphone PCA (1 mG/mL) 30 milliLiter(s) PCA Continuous PCA Continuous  insulin lispro (HumaLOG) corrective regimen sliding scale   SubCutaneous three times a day before meals  insulin lispro (HumaLOG) corrective regimen sliding scale   SubCutaneous at bedtime  ipratropium    for Nebulization 500 MICROGram(s) Nebulizer every 6 hours  lactated ringers. 1000 milliLiter(s) (30 mL/Hr) IV Continuous <Continuous>    MEDICATIONS  (PRN):  ALBUTerol    90 MICROgram(s) HFA Inhaler 2 Puff(s) Inhalation every 4 hours PRN Shortness of Breath and/or Wheezing  dextrose 40% Gel 15 Gram(s) Oral once PRN Blood Glucose LESS THAN 70 milliGRAM(s)/deciliter  glucagon  Injectable 1 milliGRAM(s) IntraMuscular once PRN Glucose LESS THAN 70 milligrams/deciliter  HYDROmorphone PCA (1 mG/mL) Rescue Clinician Bolus 0.5 milliGRAM(s) IV Push every 15 minutes PRN for Pain Scale GREATER THAN 6  naloxone Injectable 0.1 milliGRAM(s) IV Push every 3 minutes PRN For ANY of the following changes in patient status:  A. RR LESS THAN 10 breaths per minute, B. Oxygen saturation LESS THAN 90%, C. Sedation score of 6  ondansetron Injectable 4 milliGRAM(s) IV Push every 6 hours PRN Nausea    ======================= PHYSICAL EXAM===================  General:          Comfortable, Awake, alert, no distress  Neuro:             Moving all extremities to commands. No focal deficits	  HEENT:                           MAGED   Respiratory:	Lungs clear on auscultation bilaterally with good aeration.                            No rales, rhonchi, no wheezing. Effort even and unlabored.  CV:	            Regular rate and rhythm. Normal S1/S2. No murmurs  Abdomen:	Soft,  nontender, not-distended. Bowel sounds present   Skin:		No rash.  Extremities:	Warm, no cyanosis or edema.  Palpable pulses    ============================ LABS =======================                        13.0   9.36  )-----------( 203      ( 02 Mar 2019 02:30 )             39.3     03-02    137  |  101  |  12  ----------------------------<  132<H>  3.6   |  23  |  0.83    Ca    8.5      02 Mar 2019 02:30  Phos  3.2     03-02  Mg     2.1     03-02    TPro  6.0  /  Alb  3.2<L>  /  TBili  0.7  /  DBili  x   /  AST  14  /  ALT  10  /  AlkPhos  45  03-02    LIVER FUNCTIONS - ( 02 Mar 2019 02:30 )  Alb: 3.2 g/dL / Pro: 6.0 g/dL / ALK PHOS: 45 u/L / ALT: 10 u/L / AST: 14 u/L / GGT: x           ===================== IMAGING STUDIES ===================  Radiology personally reviewed.  < from: Xray Chest 1 View- PORTABLE-Routine (03.01.19 @ 07:00) >    INTERPRETATION:     Right-sided chest tube in place. Right midlung opacity likely postop in   nature again seen. Loss of volume in this lung. No complicating effusion   or pneumothorax.    March 1 at 6:36 AM:  Right-sided chest tube unchanged. Postop subsegmental atelectasis in the   right midlung field slightly improved. No effusions or pneumothorax.    COMPARISON:  February 28    IMPRESSION:  Status post right thoracotomy with chest tube and postop   midlung atelectasis.    < end of copied text >    ====================ASSESSMENT AND PLAN ================  53y Male s/p FB. RUL wedge, MLND. Postop pt was noted to have chylous drainage from the chest tube required return to O.R for thoracic duct ligation.       Procedure: 2/27 FB. RUL wedge, MLNDFB.        2/28 Robotic right VATS, ligation of thoracic duct, control of chyle leak    Issues:  Chylothorax  Lung nodule  DM-2  Postop pain              chest tube in place  ====================== NEUROLOGY=======================  Pain control with PCA /  Tylenol IV    OOB, ambulation  ==================== RESPIRATORY========================  Pt is on  2     L nasal canula    Comfortable, no evidence of distress.  Using incentive spirometry & doing 1000 ml  Monitor chest tube output  Chest tube to suction 	  Continuous pulse oximetry monitoring  Continue bronchodilators, pulmonary toilet  Head of bed elevation to 30-40 degrees  ====================CARDIOVASCULAR=====================  Continue hemodynamic monitoring/ telemetry  Not on any pressors   ===================== RENAL ============================  Continue LR 30CC/hr      Monitor I/Os, BUN/ Cr  and electrolytes     ==================== GASTROINTESTINAL===================  On PO low fat diet, tolerating well  Continue Zofran / Reglan for nausea - PRN	    =======================    ENDOCRIN  =====================  Glycemic monitoring  F/S with coverage  ===================HEMATOLOGIC/ONCOLOGIC =============  Monitor chest tube output. No signs of active bleeding.   Follow CBC, coags     DVT prophylaxis with SCD, sc Heparin  ========================INFECTIOUS DISEASE===============  No signs of infection. Monitor for fever / leukocytosis.  All surgical incision / chest tube  sites look clean    Pertinent clinical, laboratory, radiographic, hemodynamic, echocardiographic, respiratory data, microbiologic data and chart were reviewed and analyzed frequently throughout the course of the day and night. GI and DVT prophylaxis, glycemic control, head of bed elevation and skin care issues were addressed.  Patient seen, examined and plan discussed with CT Surgery / CTICU team during rounds.  Pt remains critically ill in imminent risk of  deterioration and requires very careful cardio- pulmonary monitoring and support.    I have spent  35  minutes of critical care time with this pt between 12  am   and   9 am         minutes spent on total encounter; more than 50% of the visit was spent counseling and/or coordinating care by the attending physician.      TRISH Castañeda MD

## 2019-03-02 NOTE — PROGRESS NOTE ADULT - SUBJECTIVE AND OBJECTIVE BOX
Anesthesia Pain Management Service    SUBJECTIVE: Patient is doing well with IV PCA and no significant problems reported.    Pain Scale Score	At rest: ___ 	With Activity: ___ 	[X ] Refer to charted pain scores    THERAPY:    [ ] IV PCA Morphine		[ ] 5 mg/mL	[ ] 1 mg/mL  [X ] IV PCA Hydromorphone	[ ] 5 mg/mL	[X ] 1 mg/mL  [ ] IV PCA Fentanyl		[ ] 50 micrograms/mL    Demand dose __0.2_ lockout __6_ (minutes) Continuous Rate _0__ Total: __4.8_   mg used (in past 24 hrs)      MEDICATIONS  (STANDING):  acetaminophen  IVPB .. 1000 milliGRAM(s) IV Intermittent once  dextrose 5%. 1000 milliLiter(s) (50 mL/Hr) IV Continuous <Continuous>  dextrose 50% Injectable 12.5 Gram(s) IV Push once  dextrose 50% Injectable 25 Gram(s) IV Push once  dextrose 50% Injectable 25 Gram(s) IV Push once  heparin  Injectable 5000 Unit(s) SubCutaneous every 8 hours  insulin lispro (HumaLOG) corrective regimen sliding scale   SubCutaneous three times a day before meals  insulin lispro (HumaLOG) corrective regimen sliding scale   SubCutaneous at bedtime  ipratropium    for Nebulization 500 MICROGram(s) Nebulizer every 6 hours  lactated ringers. 1000 milliLiter(s) (30 mL/Hr) IV Continuous <Continuous>    MEDICATIONS  (PRN):  ALBUTerol    90 MICROgram(s) HFA Inhaler 2 Puff(s) Inhalation every 4 hours PRN Shortness of Breath and/or Wheezing  dextrose 40% Gel 15 Gram(s) Oral once PRN Blood Glucose LESS THAN 70 milliGRAM(s)/deciliter  glucagon  Injectable 1 milliGRAM(s) IntraMuscular once PRN Glucose LESS THAN 70 milligrams/deciliter  HYDROmorphone  Injectable 0.5 milliGRAM(s) IV Push every 3 hours PRN Breakthrough Pain or if not tolerating Oxycodone  oxyCODONE    IR 5 milliGRAM(s) Oral every 3 hours PRN Mild Pain (1 - 3)  oxyCODONE    IR 10 milliGRAM(s) Oral every 3 hours PRN Moderate Pain (4 - 6) to Severe Pain      OBJECTIVE:    Sedation Score:	[ X] Alert	[ ] Drowsy 	[ ] Arousable	[ ] Asleep	[ ] Unresponsive    Side Effects:	[X ] None	[ ] Nausea	[ ] Vomiting	[ ] Pruritus  		[ ] Other:    Vital Signs Last 24 Hrs  T(C): 36.8 (02 Mar 2019 12:00), Max: 37.4 (01 Mar 2019 20:00)  T(F): 98.3 (02 Mar 2019 12:00), Max: 99.4 (01 Mar 2019 20:00)  HR: 87 (02 Mar 2019 12:00) (67 - 87)  BP: 124/75 (02 Mar 2019 12:00) (103/51 - 124/75)  BP(mean): 85 (02 Mar 2019 12:00) (62 - 90)  RR: 28 (02 Mar 2019 12:00) (12 - 28)  SpO2: 94% (02 Mar 2019 12:00) (94% - 97%)    ASSESSMENT/ PLAN    Therapy to  be:	[ ] Continue   [ X] Discontinued   [X ] Change to prn Analgesics    Documentation and Verification of current medications:   [X] Done	[ ] Not done, not elligible    Comments: PRN Oral/IV opioids and/or Adjuvant non-opioid medication to be ordered at this point. Discussed with CT ICU attending. Pt prefers oral opioid now.    Progress Note written now but Patient was seen earlier.

## 2019-03-02 NOTE — PROGRESS NOTE ADULT - PROVIDER SPECIALTY LIST ADULT
Anesthesia
Anesthesia
Critical Care
Pain Medicine
Critical Care

## 2019-03-03 ENCOUNTER — TRANSCRIPTION ENCOUNTER (OUTPATIENT)
Age: 54
End: 2019-03-03

## 2019-03-03 VITALS
TEMPERATURE: 99 F | SYSTOLIC BLOOD PRESSURE: 129 MMHG | DIASTOLIC BLOOD PRESSURE: 77 MMHG | OXYGEN SATURATION: 95 % | RESPIRATION RATE: 18 BRPM | HEART RATE: 76 BPM

## 2019-03-03 PROCEDURE — 99238 HOSP IP/OBS DSCHRG MGMT 30/<: CPT

## 2019-03-03 PROCEDURE — 71045 X-RAY EXAM CHEST 1 VIEW: CPT | Mod: 26

## 2019-03-03 RX ORDER — DOCUSATE SODIUM 100 MG
1 CAPSULE ORAL
Qty: 0 | Refills: 0 | COMMUNITY
Start: 2019-03-03

## 2019-03-03 RX ORDER — POLYETHYLENE GLYCOL 3350 17 G/17G
17 POWDER, FOR SOLUTION ORAL
Qty: 0 | Refills: 0 | COMMUNITY
Start: 2019-03-03

## 2019-03-03 RX ORDER — ACETAMINOPHEN 500 MG
650 TABLET ORAL
Qty: 0 | Refills: 0 | COMMUNITY

## 2019-03-03 RX ORDER — OXYCODONE HYDROCHLORIDE 5 MG/1
1 TABLET ORAL
Qty: 30 | Refills: 0 | OUTPATIENT
Start: 2019-03-03 | End: 2019-03-07

## 2019-03-03 RX ORDER — IBUPROFEN 200 MG
400 TABLET ORAL
Qty: 0 | Refills: 0 | COMMUNITY

## 2019-03-03 RX ORDER — OXYCODONE HYDROCHLORIDE 5 MG/1
10 TABLET ORAL ONCE
Qty: 0 | Refills: 0 | Status: COMPLETED | OUTPATIENT
Start: 2019-03-03 | End: 2019-03-03

## 2019-03-03 RX ADMIN — Medication 1: at 09:15

## 2019-03-03 RX ADMIN — OXYCODONE HYDROCHLORIDE 10 MILLIGRAM(S): 5 TABLET ORAL at 04:57

## 2019-03-03 RX ADMIN — Medication 100 MILLIGRAM(S): at 04:57

## 2019-03-03 RX ADMIN — OXYCODONE HYDROCHLORIDE 10 MILLIGRAM(S): 5 TABLET ORAL at 13:33

## 2019-03-03 RX ADMIN — OXYCODONE HYDROCHLORIDE 10 MILLIGRAM(S): 5 TABLET ORAL at 09:16

## 2019-03-03 RX ADMIN — OXYCODONE HYDROCHLORIDE 10 MILLIGRAM(S): 5 TABLET ORAL at 05:50

## 2019-03-03 RX ADMIN — OXYCODONE HYDROCHLORIDE 10 MILLIGRAM(S): 5 TABLET ORAL at 14:34

## 2019-03-03 RX ADMIN — HEPARIN SODIUM 5000 UNIT(S): 5000 INJECTION INTRAVENOUS; SUBCUTANEOUS at 13:34

## 2019-03-03 RX ADMIN — Medication 500 MICROGRAM(S): at 09:49

## 2019-03-03 RX ADMIN — OXYCODONE HYDROCHLORIDE 10 MILLIGRAM(S): 5 TABLET ORAL at 10:16

## 2019-03-03 RX ADMIN — HEPARIN SODIUM 5000 UNIT(S): 5000 INJECTION INTRAVENOUS; SUBCUTANEOUS at 04:57

## 2019-03-03 NOTE — DISCHARGE NOTE ADULT - MEDICATION SUMMARY - MEDICATIONS TO TAKE
I will START or STAY ON the medications listed below when I get home from the hospital:    Tylenol 325 mg oral capsule  -- 650 milligram(s) by mouth every 4 hours, As Needed mild pain  -- Indication: For mild pain    ibuprofen 200 mg oral tablet  -- 400 milligram(s) by mouth every 6 hours, As Needed moderate pain  -- Indication: For moderate pain    oxyCODONE 5 mg oral capsule  -- 1 cap(s) by mouth every 4 hours, As Needed moderate painMDD:6   -- Caution federal law prohibits the transfer of this drug to any person other  than the person for whom it was prescribed.  It is very important that you take or use this exactly as directed.  Do not skip doses or discontinue unless directed by your doctor.  May cause drowsiness.  Alcohol may intensify this effect.  Use care when operating dangerous machinery.  This prescription cannot be refilled.  Using more of this medication than prescribed may cause serious breathing problems.    -- Indication: For moderate pain    metFORMIN 500 mg oral tablet  -- 1 tab(s) by mouth 3 times a day  -- Indication: For Diabetes mellitus    docusate sodium 100 mg oral capsule  -- 1 cap(s) by mouth 2 times a day  -- Indication: For Stool softener, as needed    polyethylene glycol 3350 oral powder for reconstitution  -- 17 gram(s) by mouth once a day, As needed, Constipation  -- Indication: For laxative, as needed

## 2019-03-03 NOTE — DISCHARGE NOTE ADULT - PATIENT PORTAL LINK FT
You can access the [facility] Patient Portal, offered by [facility], by registering with the following website: [email or web address]

## 2019-03-03 NOTE — DISCHARGE NOTE ADULT - CARE PLAN
Principal Discharge DX:	Solitary pulmonary nodule  Goal:	diagnosis of nodule  Assessment and plan of treatment:	wound healing, pathology pending

## 2019-03-03 NOTE — DISCHARGE NOTE ADULT - ADDITIONAL INSTRUCTIONS
please call Dr Johnson's office to schedule a follow up appointment in 1-2 weeks, have a chest xray done 1-2 days before and bring a copy with to Dr johnson's office

## 2019-03-03 NOTE — DISCHARGE NOTE ADULT - CARE PROVIDER_API CALL
John Sims (MD)  Surgery; Thoracic Surgery  [address] [address] [place] [address] [place] [address]  Phone: [phone]  Fax: [phone]  Follow Up Time:

## 2019-03-03 NOTE — DISCHARGE NOTE ADULT - INSTRUCTIONS
low fat diet If you are having feelings of chest pain or shortness of breath, call 911 immediately. Follow up with all doctor appointments as explained above. Do not do any heavy lifting. Continue to keep site clean and dry.

## 2019-03-03 NOTE — DISCHARGE NOTE ADULT - HOSPITAL COURSE
HPI:  53 y.o. male with hx of DM, reports hx of abnormal CXR ~6 months ago, followed by CT scan, diagnosed with solitary pulmonary nodule, pt denies cough, sputum production, hemoptysis, weight loss or fatigue, presents to [facility] for evaluation for Right Video Assisted Thoracoscopy, Robotic Assisted Right Upper Lobe Wedge Resection, Right Upper Lobectomy, Mediastinal Lymph Node Dissection on 02/27/19 (19 Feb 2019 10:06)  pt underwent robotic RUL wedge, the next day the chest tube drainage was noted to be milky. The pt was taken back to OR and a thoracic duct ligation was performed. The drainage subsided and pt discharged on low fat diet

## 2019-03-05 LAB — SURGICAL PATHOLOGY STUDY: SIGNIFICANT CHANGE UP

## 2019-03-14 PROBLEM — E11.9 TYPE 2 DIABETES MELLITUS WITHOUT COMPLICATIONS: Chronic | Status: ACTIVE | Noted: 2019-02-19

## 2019-03-14 PROBLEM — R91.1 SOLITARY PULMONARY NODULE: Chronic | Status: ACTIVE | Noted: 2019-02-19

## 2019-03-19 ENCOUNTER — APPOINTMENT (OUTPATIENT)
Dept: THORACIC SURGERY | Facility: CLINIC | Age: 54
End: 2019-03-19
Payer: MEDICAID

## 2019-03-19 VITALS
TEMPERATURE: 98.1 F | DIASTOLIC BLOOD PRESSURE: 87 MMHG | RESPIRATION RATE: 17 BRPM | OXYGEN SATURATION: 98 % | HEART RATE: 73 BPM | SYSTOLIC BLOOD PRESSURE: 147 MMHG | WEIGHT: 185 LBS | BODY MASS INDEX: 27.32 KG/M2

## 2019-03-19 PROCEDURE — 99024 POSTOP FOLLOW-UP VISIT: CPT

## 2019-04-30 ENCOUNTER — FORM ENCOUNTER (OUTPATIENT)
Age: 54
End: 2019-04-30

## 2019-04-30 ENCOUNTER — APPOINTMENT (OUTPATIENT)
Dept: THORACIC SURGERY | Facility: CLINIC | Age: 54
End: 2019-04-30
Payer: MEDICAID

## 2019-04-30 VITALS
OXYGEN SATURATION: 98 % | SYSTOLIC BLOOD PRESSURE: 148 MMHG | RESPIRATION RATE: 17 BRPM | HEIGHT: 69 IN | HEART RATE: 74 BPM | DIASTOLIC BLOOD PRESSURE: 68 MMHG | TEMPERATURE: 98.1 F | WEIGHT: 181 LBS | BODY MASS INDEX: 26.81 KG/M2

## 2019-04-30 PROCEDURE — 99024 POSTOP FOLLOW-UP VISIT: CPT

## 2019-05-01 ENCOUNTER — APPOINTMENT (OUTPATIENT)
Dept: THORACIC SURGERY | Facility: CLINIC | Age: 54
End: 2019-05-01
Payer: MEDICAID

## 2019-05-01 ENCOUNTER — APPOINTMENT (OUTPATIENT)
Dept: RADIOLOGY | Facility: HOSPITAL | Age: 54
End: 2019-05-01

## 2019-05-01 ENCOUNTER — OUTPATIENT (OUTPATIENT)
Dept: OUTPATIENT SERVICES | Facility: HOSPITAL | Age: 54
LOS: 1 days | End: 2019-05-01
Payer: MEDICAID

## 2019-05-01 ENCOUNTER — RESULT REVIEW (OUTPATIENT)
Age: 54
End: 2019-05-01

## 2019-05-01 VITALS
DIASTOLIC BLOOD PRESSURE: 92 MMHG | OXYGEN SATURATION: 98 % | SYSTOLIC BLOOD PRESSURE: 149 MMHG | HEART RATE: 76 BPM | RESPIRATION RATE: 16 BRPM

## 2019-05-01 DIAGNOSIS — R76.11 NONSPECIFIC REACTION TO TUBERCULIN SKIN TEST WITHOUT ACTIVE TUBERCULOSIS: ICD-10-CM

## 2019-05-01 DIAGNOSIS — J90 PLEURAL EFFUSION, NOT ELSEWHERE CLASSIFIED: ICD-10-CM

## 2019-05-01 LAB — TRIGL FLD-MCNC: 54 MG/DL — SIGNIFICANT CHANGE UP

## 2019-05-01 PROCEDURE — 88112 CYTOPATH CELL ENHANCE TECH: CPT | Mod: 26

## 2019-05-01 PROCEDURE — 88342 IMHCHEM/IMCYTCHM 1ST ANTB: CPT | Mod: 26

## 2019-05-01 PROCEDURE — 99024 POSTOP FOLLOW-UP VISIT: CPT

## 2019-05-01 PROCEDURE — 88341 IMHCHEM/IMCYTCHM EA ADD ANTB: CPT | Mod: 26

## 2019-05-01 PROCEDURE — 71046 X-RAY EXAM CHEST 2 VIEWS: CPT | Mod: 26

## 2019-05-01 PROCEDURE — 88305 TISSUE EXAM BY PATHOLOGIST: CPT | Mod: 26

## 2019-05-01 RX ORDER — METFORMIN ER 500 MG 500 MG/1
500 TABLET ORAL
Qty: 30 | Refills: 0 | Status: ACTIVE | COMMUNITY
Start: 2019-02-12

## 2019-05-01 RX ORDER — LOSARTAN POTASSIUM 50 MG/1
50 TABLET, FILM COATED ORAL
Qty: 30 | Refills: 0 | Status: ACTIVE | COMMUNITY
Start: 2019-02-19

## 2019-05-02 LAB
GRAM STN WND: SIGNIFICANT CHANGE UP
SPECIMEN SOURCE: SIGNIFICANT CHANGE UP

## 2019-05-02 NOTE — HISTORY OF PRESENT ILLNESS
[FreeTextEntry1] : 55 y/o M, current smoker, w/ hx of HTN, HLD, DM2, acute Hep C (treated in ~2004), and lung nodule found in Nov 2018 s/p FNA.\par Of note, patient takes medications for HTN, HLD and DM2, but he did not bring any of his medications and he does not recall names or dosages. He states that he obtains his medications from [place].\par \par CT Chest on 11/3/18:\par - a 1.1 x 0.9 x 0.9cm RUL ggo (series 3 image 62)\par - few tiny subpleural nodular foci in the WAI measuring up to 2mm\par \par FNA of RUL on 11/7/18 at [facility]/. Path showed rare atypical cells; benign lung parenchyma w/ mild fibrosis.\par \par PFTs on 1/28/19: FVC 90%, FEV1 94%, DLCO 75%.\par \par Now 9wks s/p Rt VATS, Robotic-assisted, wedge rxn of RUL, MLND on 2/27/19. Postoperatively complicated by chylothorax and underwent a robotic-assisted thoracic duct ligation on 2/28/19. 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 \par \par CXR 4/30/19 -- moderate pleural effusion with Rt basilar atelectasis.\par \par Patient is here today for thoracentesis. Admits to SOB on exertion.

## 2019-05-02 NOTE — PHYSICAL EXAM
[Auscultation Breath Sounds / Voice Sounds] : lungs were clear to auscultation bilaterally [Heart Sounds] : normal S1 and S2 [Heart Rate And Rhythm] : heart rate was normal and rhythm regular [Heart Sounds Gallop] : no gallops [Murmurs] : no murmurs [Heart Sounds Pericardial Friction Rub] : no pericardial rub [Examination Of The Chest] : the chest was normal in appearance [Chest Visual Inspection Thoracic Asymmetry] : no chest asymmetry [Diminished Respiratory Excursion] : normal chest expansion [Bowel Sounds] : normal bowel sounds [Abdomen Soft] : soft [Abdomen Tenderness] : non-tender [Abdomen Mass (___ Cm)] : no abdominal mass palpated [Abnormal Walk] : normal gait [Nail Clubbing] : no clubbing  or cyanosis of the fingernails [Musculoskeletal - Swelling] : no joint swelling seen [Motor Tone] : muscle strength and tone were normal [Skin Turgor] : normal skin turgor [Skin Color & Pigmentation] : normal skin color and pigmentation [] : no rash [Deep Tendon Reflexes (DTR)] : deep tendon reflexes were 2+ and symmetric [Sensation] : the sensory exam was normal to light touch and pinprick [No Focal Deficits] : no focal deficits [Oriented To Time, Place, And Person] : oriented to person, place, and time [Impaired Insight] : insight and judgment were intact [Affect] : the affect was normal [FreeTextEntry1] : mild diminished BS to RLL

## 2019-05-02 NOTE — ASSESSMENT
[FreeTextEntry1] : 53 y/o M, current smoker, w/ hx of HTN, HLD, DM2, acute Hep C (treated in ~2004), and lung nodule found in Nov 2018 s/p FNA.\par \par Now 9wks s/p Rt VATS, Robotic-assisted, wedge rxn of RUL, MLND on 2/27/19. Postoperatively complicated by chylothorax and underwent a robotic-assisted thoracic duct ligation on 2/28/19. 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 \par \par I have reviewed the patient's medical records and diagnostic images at time of this office consultation and have made the following recommendation:\par 1. U/S-guided Thoracentesis by CORRINE Morgan, moderate to large amount of pleural effusion seen, 850mL serosanguineous fluid drained, fluids sent off for cytology, c/s and triglyceride.\par 2. Sent for STAT CXR after Thoracentesis, clear, no PTX\par 3. RTC in 1 wk w/ CXR. To eat a low fat diet.\par \par \par Written by Florin Suero NP, acting as a scribe for Dr. John Walker.\par \par The documentation recorded by the scribe accurately reflects the service I personally performed and the decisions made by me. JOHN WALKER MD\par

## 2019-05-02 NOTE — CONSULT LETTER
[Dear  ___] : Dear  [unfilled], [Consult Letter:] : I had the pleasure of evaluating your patient, [unfilled]. [( Thank you for referring [unfilled] for consultation for _____ )] : Thank you for referring [unfilled] for consultation for [unfilled] [Please see my note below.] : Please see my note below. [Consult Closing:] : Thank you very much for allowing me to participate in the care of this patient.  If you have any questions, please do not hesitate to contact me. [Sincerely,] : Sincerely, [DrMorris  ___] : Dr. AREVALO [FreeTextEntry2] : Nathen Reeder MD (Pul/Referring)\yonathan Shin MD (PCP)  [FreeTextEntry3] : John Sims MD, MPH \par System Director of Thoracic Surgery \par Director of Comprehensive Lung and Foregut [place] \par Professor Cardiovascular & Thoracic Surgery  \par [facility] [employer] at [facility]\par

## 2019-05-07 ENCOUNTER — APPOINTMENT (OUTPATIENT)
Dept: THORACIC SURGERY | Facility: CLINIC | Age: 54
End: 2019-05-07
Payer: MEDICAID

## 2019-05-07 VITALS
BODY MASS INDEX: 26.43 KG/M2 | WEIGHT: 179 LBS | OXYGEN SATURATION: 97 % | HEART RATE: 83 BPM | DIASTOLIC BLOOD PRESSURE: 89 MMHG | SYSTOLIC BLOOD PRESSURE: 145 MMHG | TEMPERATURE: 98 F | RESPIRATION RATE: 17 BRPM

## 2019-05-07 PROCEDURE — 99024 POSTOP FOLLOW-UP VISIT: CPT

## 2019-05-08 LAB — CULTURE - SURGICAL SITE: SIGNIFICANT CHANGE UP

## 2019-05-10 PROBLEM — Z09 POSTOP CHECK: Status: ACTIVE | Noted: 2019-03-14

## 2019-05-14 ENCOUNTER — APPOINTMENT (OUTPATIENT)
Dept: THORACIC SURGERY | Facility: CLINIC | Age: 54
End: 2019-05-14
Payer: MEDICAID

## 2019-05-14 VITALS
TEMPERATURE: 98.2 F | RESPIRATION RATE: 17 BRPM | DIASTOLIC BLOOD PRESSURE: 87 MMHG | HEART RATE: 88 BPM | SYSTOLIC BLOOD PRESSURE: 131 MMHG | OXYGEN SATURATION: 96 %

## 2019-05-14 DIAGNOSIS — Z09 ENCOUNTER FOR FOLLOW-UP EXAMINATION AFTER COMPLETED TREATMENT FOR CONDITIONS OTHER THAN MALIGNANT NEOPLASM: ICD-10-CM

## 2019-05-14 PROCEDURE — 99024 POSTOP FOLLOW-UP VISIT: CPT

## 2019-08-13 PROBLEM — J90 PLEURAL EFFUSION: Status: ACTIVE | Noted: 2019-05-01

## 2019-08-15 ENCOUNTER — APPOINTMENT (OUTPATIENT)
Dept: THORACIC SURGERY | Facility: CLINIC | Age: 54
End: 2019-08-15
Payer: MEDICAID

## 2019-08-15 VITALS
OXYGEN SATURATION: 97 % | RESPIRATION RATE: 17 BRPM | DIASTOLIC BLOOD PRESSURE: 77 MMHG | SYSTOLIC BLOOD PRESSURE: 132 MMHG | HEART RATE: 88 BPM | WEIGHT: 192 LBS | BODY MASS INDEX: 28.35 KG/M2 | TEMPERATURE: 98 F

## 2019-08-15 DIAGNOSIS — J90 PLEURAL EFFUSION, NOT ELSEWHERE CLASSIFIED: ICD-10-CM

## 2019-08-15 PROCEDURE — 99213 OFFICE O/P EST LOW 20 MIN: CPT

## 2019-08-16 NOTE — PHYSICAL EXAM
[Auscultation Breath Sounds / Voice Sounds] : lungs were clear to auscultation bilaterally [Heart Rate And Rhythm] : heart rate was normal and rhythm regular [Heart Sounds] : normal S1 and S2 [Heart Sounds Gallop] : no gallops [Murmurs] : no murmurs [Heart Sounds Pericardial Friction Rub] : no pericardial rub [Examination Of The Chest] : the chest was normal in appearance [Chest Visual Inspection Thoracic Asymmetry] : no chest asymmetry [Diminished Respiratory Excursion] : normal chest expansion [Bowel Sounds] : normal bowel sounds [Abdomen Soft] : soft [Abdomen Tenderness] : non-tender [Abdomen Mass (___ Cm)] : no abdominal mass palpated [Abnormal Walk] : normal gait [Nail Clubbing] : no clubbing  or cyanosis of the fingernails [Musculoskeletal - Swelling] : no joint swelling seen [Motor Tone] : muscle strength and tone were normal [Skin Color & Pigmentation] : normal skin color and pigmentation [Skin Turgor] : normal skin turgor [] : no rash [Deep Tendon Reflexes (DTR)] : deep tendon reflexes were 2+ and symmetric [Sensation] : the sensory exam was normal to light touch and pinprick [No Focal Deficits] : no focal deficits [Oriented To Time, Place, And Person] : oriented to person, place, and time [Affect] : the affect was normal [Impaired Insight] : insight and judgment were intact

## 2019-08-19 NOTE — CONSULT LETTER
[Dear  ___] : Dear  [unfilled], [Consult Letter:] : I had the pleasure of evaluating your patient, [unfilled]. [( Thank you for referring [unfilled] for consultation for _____ )] : Thank you for referring [unfilled] for consultation for [unfilled] [Please see my note below.] : Please see my note below. [Sincerely,] : Sincerely, [Consult Closing:] : Thank you very much for allowing me to participate in the care of this patient.  If you have any questions, please do not hesitate to contact me. [DrMorris  ___] : Dr. AREVALO [FreeTextEntry2] : Nathne Reeder MD (Pul/Referring)\yonathan Shin MD (PCP)  [FreeTextEntry3] : John Sims MD, MPH \par System Director of Thoracic Surgery \par Director of Comprehensive Lung and Foregut [place] \par Professor Cardiovascular & Thoracic Surgery  \par [facility] [employer] at [facility]\par

## 2019-08-19 NOTE — HISTORY OF PRESENT ILLNESS
[FreeTextEntry1] : 55 y/o M, current smoker, w/ hx of HTN, HLD, DM2, acute Hep C (treated in ~2004), and lung nodule found in Nov 2018 s/p FNA.\par Of note, patient takes medications for HTN, HLD and DM2, but he did not bring any of his medications and he does not recall names or dosages. He states that he obtains his medications from [place].\par \par CT Chest on 11/3/18:\par - a 1.1 x 0.9 x 0.9cm RUL ggo (series 3 image 62)\par - few tiny subpleural nodular foci in the WAI measuring up to 2mm\par \par FNA of RUL on 11/7/18 at [facility]/. Path showed rare atypical cells; benign lung parenchyma w/ mild fibrosis.\par \par PFTs on 1/28/19: FVC 90%, FEV1 94%, DLCO 75%.\par \par Now 6mo s/p Rt VATS, Robotic-assisted, wedge rxn of RUL, MLND on 2/27/19. Postoperatively complicated by chylothorax and underwent a robotic-assisted thoracic duct ligation on 2/28/19. 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 \par \par S/P Rt sided Thoracentesis on 5/1/19 in office by CORRINE Morgan. 850mL serosanguineous fluid drained. Cytopathology of right pleural fluid revealed benign findings, triglyceride was 54, and cultures negative to date. \par \par CT Chest on 5/13/19:\par - postop changes\par -small to moderate-sized loculated Rt sided pleural effusion\par \par CXR today -- clear.\par \par Patient is here today for a follow up. Denies SOB, CP, Cough.
Appendicitis

## 2019-11-19 PROBLEM — C34.91 ADENOCARCINOMA OF RIGHT LUNG: Status: ACTIVE | Noted: 2019-03-14

## 2019-11-19 NOTE — ASSESSMENT
[FreeTextEntry1] : 55 y/o M, current smoker, w/ hx of HTN, HLD, DM2, acute Hep C (treated in ~2004), and lung CA.\par \par Now 11mo s/p Rt VATS, Robotic-assisted, wedge rxn of RUL, MLND on 2/27/19. 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 \par Postoperatively complicated by chylothorax and underwent a robotic-assisted thoracic duct ligation on 2/28/19.\par \par \par \par I have reviewed the patient's medical records and diagnostic images at time of this office consultation and have made the following recommendation:\par 1.\par \par \par Written by Florin Suero NP, acting as a scribe for Dr. John Walker.\par \par The documentation recorded by the scribe accurately reflects the service I personally performed and the decisions made by me. JOHN WALKER MD\par

## 2019-11-19 NOTE — HISTORY OF PRESENT ILLNESS
[FreeTextEntry1] : 53 y/o M, current smoker, w/ hx of HTN, HLD, DM2, acute Hep C (treated in ~2004), and lung CA.\par \par FNA of RUL on 11/7/18 at [facility]/. Path showed rare atypical cells; benign lung parenchyma w/ mild fibrosis.\par \par Now 11mo s/p Rt VATS, Robotic-assisted, wedge rxn of RUL, MLND on 2/27/19. 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 \par Postoperatively complicated by chylothorax and underwent a robotic-assisted thoracic duct ligation on 2/28/19.\par \par S/P Rt sided Thoracentesis on 5/1/19 in office by CORRINE Morgan. 850mL serosanguineous fluid drained. Cytopathology of right pleural fluid revealed benign findings, triglyceride was 54, and cultures negative to date. \par \par CT Chest on 5/13/19:\par - postop changes\par -small to moderate-sized loculated Rt sided pleural effusion\par \par CT Chest on ..\par - \par \par Patient is here today for a follow up.

## 2019-11-19 NOTE — CONSULT LETTER
[( Thank you for referring [unfilled] for consultation for _____ )] : Thank you for referring [unfilled] for consultation for [unfilled] [Consult Letter:] : I had the pleasure of evaluating your patient, [unfilled]. [Dear  ___] : Dear  [unfilled], [Consult Closing:] : Thank you very much for allowing me to participate in the care of this patient.  If you have any questions, please do not hesitate to contact me. [Please see my note below.] : Please see my note below. [FreeTextEntry2] : Nathen Reeder MD (Pul/Referring)\yonathan Shin MD (PCP) [Sincerely,] : Sincerely, [FreeTextEntry3] : John Sims MD, MPH \par System Director of Thoracic Surgery \par Director of Comprehensive Lung and Foregut [place] \par Professor Cardiovascular & Thoracic Surgery  \par [facility] [employer] at [facility]\par  [DrMorris  ___] : Dr. AREVALO

## 2019-11-21 ENCOUNTER — APPOINTMENT (OUTPATIENT)
Dept: THORACIC SURGERY | Facility: CLINIC | Age: 54
End: 2019-11-21

## 2019-11-21 DIAGNOSIS — C34.91 MALIGNANT NEOPLASM OF UNSPECIFIED PART OF RIGHT BRONCHUS OR LUNG: ICD-10-CM

## 2020-03-01 ENCOUNTER — OUTPATIENT (OUTPATIENT)
Dept: OUTPATIENT SERVICES | Facility: HOSPITAL | Age: 55
LOS: 1 days | End: 2020-03-01

## 2020-03-23 ENCOUNTER — EMERGENCY (EMERGENCY)
Facility: HOSPITAL | Age: 55
LOS: 1 days | Discharge: HOME | End: 2020-03-23
Attending: INTERNAL MEDICINE | Admitting: INTERNAL MEDICINE
Payer: MEDICAID

## 2020-03-23 VITALS
RESPIRATION RATE: 18 BRPM | DIASTOLIC BLOOD PRESSURE: 92 MMHG | OXYGEN SATURATION: 100 % | SYSTOLIC BLOOD PRESSURE: 126 MMHG | TEMPERATURE: 98 F | HEART RATE: 72 BPM

## 2020-03-23 VITALS
TEMPERATURE: 98 F | OXYGEN SATURATION: 98 % | HEART RATE: 80 BPM | RESPIRATION RATE: 18 BRPM | SYSTOLIC BLOOD PRESSURE: 152 MMHG | DIASTOLIC BLOOD PRESSURE: 95 MMHG

## 2020-03-23 LAB
ALBUMIN SERPL ELPH-MCNC: 4.2 G/DL — SIGNIFICANT CHANGE UP (ref 3.3–5)
ALP SERPL-CCNC: 39 U/L — LOW (ref 40–120)
ALT FLD-CCNC: 19 U/L — SIGNIFICANT CHANGE UP (ref 4–41)
ANION GAP SERPL CALC-SCNC: 11 MMO/L — SIGNIFICANT CHANGE UP (ref 7–14)
APTT BLD: 34.7 SEC — SIGNIFICANT CHANGE UP (ref 27.5–36.3)
AST SERPL-CCNC: 16 U/L — SIGNIFICANT CHANGE UP (ref 4–40)
BILIRUB SERPL-MCNC: 0.6 MG/DL — SIGNIFICANT CHANGE UP (ref 0.2–1.2)
BUN SERPL-MCNC: 17 MG/DL — SIGNIFICANT CHANGE UP (ref 7–23)
CALCIUM SERPL-MCNC: 9.4 MG/DL — SIGNIFICANT CHANGE UP (ref 8.4–10.5)
CHLORIDE SERPL-SCNC: 102 MMOL/L — SIGNIFICANT CHANGE UP (ref 98–107)
CO2 SERPL-SCNC: 25 MMOL/L — SIGNIFICANT CHANGE UP (ref 22–31)
CREAT SERPL-MCNC: 0.77 MG/DL — SIGNIFICANT CHANGE UP (ref 0.5–1.3)
GLUCOSE SERPL-MCNC: 218 MG/DL — HIGH (ref 70–99)
HCT VFR BLD CALC: 38.8 % — LOW (ref 39–50)
HGB BLD-MCNC: 13.8 G/DL — SIGNIFICANT CHANGE UP (ref 13–17)
INR BLD: 1.16 — SIGNIFICANT CHANGE UP (ref 0.88–1.17)
MAGNESIUM SERPL-MCNC: 2.2 MG/DL — SIGNIFICANT CHANGE UP (ref 1.6–2.6)
MCHC RBC-ENTMCNC: 30.4 PG — SIGNIFICANT CHANGE UP (ref 27–34)
MCHC RBC-ENTMCNC: 35.6 % — SIGNIFICANT CHANGE UP (ref 32–36)
MCV RBC AUTO: 85.5 FL — SIGNIFICANT CHANGE UP (ref 80–100)
NRBC # FLD: 0 K/UL — SIGNIFICANT CHANGE UP (ref 0–0)
PLATELET # BLD AUTO: 181 K/UL — SIGNIFICANT CHANGE UP (ref 150–400)
PMV BLD: 10.2 FL — SIGNIFICANT CHANGE UP (ref 7–13)
POTASSIUM SERPL-MCNC: 3.9 MMOL/L — SIGNIFICANT CHANGE UP (ref 3.5–5.3)
POTASSIUM SERPL-SCNC: 3.9 MMOL/L — SIGNIFICANT CHANGE UP (ref 3.5–5.3)
PROT SERPL-MCNC: 7.3 G/DL — SIGNIFICANT CHANGE UP (ref 6–8.3)
PROTHROM AB SERPL-ACNC: 13.3 SEC — HIGH (ref 9.8–13.1)
RBC # BLD: 4.54 M/UL — SIGNIFICANT CHANGE UP (ref 4.2–5.8)
RBC # FLD: 12.9 % — SIGNIFICANT CHANGE UP (ref 10.3–14.5)
SODIUM SERPL-SCNC: 138 MMOL/L — SIGNIFICANT CHANGE UP (ref 135–145)
TROPONIN T, HIGH SENSITIVITY: 12 NG/L — SIGNIFICANT CHANGE UP (ref ?–14)
TROPONIN T, HIGH SENSITIVITY: 13 NG/L — SIGNIFICANT CHANGE UP (ref ?–14)
WBC # BLD: 7.5 K/UL — SIGNIFICANT CHANGE UP (ref 3.8–10.5)
WBC # FLD AUTO: 7.5 K/UL — SIGNIFICANT CHANGE UP (ref 3.8–10.5)

## 2020-03-23 PROCEDURE — 93010 ELECTROCARDIOGRAM REPORT: CPT

## 2020-03-23 PROCEDURE — 71046 X-RAY EXAM CHEST 2 VIEWS: CPT | Mod: 26

## 2020-03-23 PROCEDURE — 99284 EMERGENCY DEPT VISIT MOD MDM: CPT | Mod: 25

## 2020-03-23 NOTE — ED PROVIDER NOTE - NS ED ROS FT
Constitutional: No fever or Chills.  No unexpected weight loss.  Head, Eyes, Ears, Nose, Throat: No visual changes.  No tongue or throat swelling or pain.  Neck: No neck stiffness.  Pulmonary: No shortness of breath or cough.  No wheezing.  Cardiovascular: + chest pain.  No palpitations, or diaphoresis.  Abdominal: No abdominal pain. No nausea, vomiting, diarrhea.  No bloody or melenic stools.  Genitourinary: No dysuria or hematuria.  No discharge.  Endocrine: No frequent urination or unusual thirst.  No hair or skin changes.  Hematologic: No lymph node swelling, easy bruising, or bleeding.  Dermatologic: No rashes.  Allergic: No new exposures, mucosal swelling, or pruritis.  Neurologic: No headache, weakness, visual changes, speech changes, or sensory abnormalities.  No fainting episodes.  No gait imbalance.  Psychiatric: No depression or insomnia.

## 2020-03-23 NOTE — ED PROVIDER NOTE - CLINICAL SUMMARY MEDICAL DECISION MAKING FREE TEXT BOX
56 M hx lung cancer S/P resection, DM, primary lateral sclerosis presents with intermittent, transient right chest pain.  Will R/O ACS although suspicion is low.  Screen for anemia and metabolic derangements.  Low suspicion for clinically significant PE given intermittent nature of symptoms, no shortness of breath, not tachycardic or hypoxemic: below threshold to test.  No evidence of infectious etiology at this time.  Plan for labs, EKG, CXR.  If unremarkable anticipate discharge with instructions for outpatient follow-up 56 M hx lung cancer S/P right upper lobectomy, DM, primary lateral sclerosis presents with intermittent, transient right chest pain.  Will R/O ACS although suspicion is low.  Screen for anemia and metabolic derangements.  Low suspicion for clinically significant PE given intermittent nature of symptoms, no shortness of breath, not tachycardic or hypoxemic: below threshold to test.  No evidence of infectious etiology at this time.  Plan for labs, EKG, CXR.  If unremarkable anticipate discharge with instructions for outpatient follow-up

## 2020-03-23 NOTE — ED PROVIDER NOTE - PATIENT PORTAL LINK FT
You can access the FollowMyHealth Patient Portal offered by [facility] by registering at the following website: [email or web address]. By joining [facility]’s FollowMyHealth portal, you will also be able to view your health information using other applications (apps) compatible with our system.

## 2020-03-23 NOTE — ED PROVIDER NOTE - OBJECTIVE STATEMENT
56 M hx lung cancer S/P resection 10 months prior: clean margins without undergoing radiation/chemotherapy, DM, primary lateral sclerosis P/W right chest pain present x 2 days, sharp, intermittent: episodes last less than 1 min then resolve spontaneously, no clear provoking or palliating factors, lancinating, localizing to right chest, non-radiating.  No shortness of breath.  No nausea/vomiting/diaphoresis.  No fever, chills or cough.  No back pain.  No bloody or melenic stools. 56 M hx lung cancer S/P resection 10 months prior: clean margins without undergoing radiation/chemotherapy, DM P/W right chest pain present x 2 days, sharp, intermittent: episodes last less than 1 min then resolve spontaneously, no clear provoking or palliating factors, lancinating, localizing to right chest, non-radiating.  No shortness of breath.  No nausea/vomiting/diaphoresis.  No fever, chills or cough.  No back pain.  No bloody or melenic stools.  Evaluated by a cardiologist 6 months prior with exercise stress performed: negative per pt.

## 2020-03-23 NOTE — ED PROVIDER NOTE - NSFOLLOWUPINSTRUCTIONS_ED_ALL_ED_FT
Please follow up with your primary medical doctor within two days and with your private cardiologist within one week.  Return to the emergency department if you feel that your condition is worsening or if you develop fever, chills, nausea or vomiting, heavy sweating, recurrence of chest pain, or shortness of breath.

## 2020-03-25 DIAGNOSIS — Z71.89 OTHER SPECIFIED COUNSELING: ICD-10-CM

## 2020-11-14 NOTE — DISCHARGE NOTE ADULT - CONDITIONS AT DISCHARGE
coagulation(Bleeding disorder R/T clinical cond/anti-coags)/age(85 years old or older) Patient alert and oriented. Stable condition. Incision site is clean, dry, and intact. IV discontinued.